# Patient Record
Sex: FEMALE | Race: WHITE | Employment: OTHER | URBAN - METROPOLITAN AREA
[De-identification: names, ages, dates, MRNs, and addresses within clinical notes are randomized per-mention and may not be internally consistent; named-entity substitution may affect disease eponyms.]

---

## 2020-08-12 LAB
CREATININE, EXTERNAL: 0.77
HBA1C MFR BLD HPLC: 5.9 %
LDL-C, EXTERNAL: 68

## 2020-08-27 NOTE — PROGRESS NOTES
[facility]  Preop Visit    Subjective:     Patria Akins is a 69 y.o. female who presents today for preoperative visit in preparation for upcoming right total knee replacement to be performed by Dr. Iona Crabtree, on an outpatient  basis. Xrays and additional studies, as appropriate, have been reviewed. Pt currently without new complaint. Past Medical History:   Diagnosis Date    Abnormal EKG     Back pain     High cholesterol     Hypertension     Osteopenia     Sciatica     Urinary frequency     UTI (urinary tract infection)        Past Surgical History:   Procedure Laterality Date    HX HYSTERECTOMY      HX ORTHOPAEDIC      Spine Surgery        Current Outpatient Medications   Medication Sig Dispense Refill    oxybutynin chloride XL (DITROPAN XL) 5 mg CR tablet Take 5 mg by mouth daily.  oxybutynin chloride XL (DITROPAN XL) 10 mg CR tablet Take 1 Tab by mouth daily. 90 Tab 6    verapamil (CALAN) 120 mg tablet Take 120 mg by mouth daily.  diazepam (VALIUM) 5 mg tablet 5 mg every eight (8) hours as needed.  celecoxib (CELEBREX) 200 mg capsule 200 mg daily.  senna-docusate (PERICOLACE) 8.6-50 mg per tablet Take 2 Tabs by Mouth Every Morning. Allergies   Allergen Reactions    Acetaminophen Rash     Tylenol #3       ROS:    Patient is a pleasant appearing individual, appropriately dressed, well hydrated, well nourished, who is alert, appropriately oriented for age, and in no acute distress with a limp gait and normal affect who does not appear to be in any significant pain. Remainder of ROS as per HPI. Objective:     Physical Exam:  VSS AFEB    Left knee - Neurovascularly intact with good cap refill, full range of motion and full strength, well healed incision noted, no swelling, no erythema, no instability.      Right knee - Decrease range of motion with flexion, Some crepitation, Grossly neurovascularly intact, Good cap refill, No skin lesion, Moderate swelling, No gross instability, Some quadriceps weakness     Studies to date:    Xrays: And all diagnostic studies have been reviewed    Labs: Reviewed    General Medicine evaluation: Although verbally we have obtained clearance, official written clearance is pending at this time    Post operative Antibiotics: Keflex  Post operative Pain Medications: Oxycodone, patient allergic to Tylenol  Post operative Blood thinning meds: Aspirin    Post operative medications, DME and physical therapy prescriptions have been provided. (Meds sent to pharmacy)    Assessment:   Primary osteoarthritis of right knee [M17.11]     Fabi Nash is a 69 y.o. female who is planning to undergo a right total knee replacement to be performed by Dr. Roxi Carnes in the near future. We will plan to proceed on an outpatient  basis. At this time, they are an appropriate candidate for surgery. The risks, benefits, complications and alternatives have been outlined with the patient at length and they voice an understanding. Based on the fact that we agree that the potential benefits outweigh the potential lists, we will plan to proceed as discussed. Ms. Sarah Alexander has a reminder for a \"due or due soon\" health maintenance. I have asked that she contact her primary care provider for follow-up on this health maintenance. Plan:   -Proceed with right total knee arthroplasty to be performed by DR. Rogers at [facility] on an outpatient basis. -Preoperative instructions have been reviewed with and provided to the patient, at length  -Patient voices understanding that any skin abnormality to the skin at the site of the involved area of planned surgery may result in cancellation and/or postponement of surgery.  -Physical therapy  to start shortly after surgery.  -Follow up 1 week postoperatively with me for close follow up.     Shani Conroy, MS, PA-C

## 2020-08-28 ENCOUNTER — OFFICE VISIT (OUTPATIENT)
Dept: ORTHOPEDIC SURGERY | Age: 70
End: 2020-08-28

## 2020-08-28 VITALS — HEIGHT: 60 IN | WEIGHT: 199 LBS | BODY MASS INDEX: 39.07 KG/M2

## 2020-08-28 DIAGNOSIS — M17.11 PRIMARY OSTEOARTHRITIS OF RIGHT KNEE: Primary | ICD-10-CM

## 2020-08-28 DIAGNOSIS — M25.561 CHRONIC PAIN OF RIGHT KNEE: ICD-10-CM

## 2020-08-28 DIAGNOSIS — E66.01 SEVERE OBESITY (HCC): ICD-10-CM

## 2020-08-28 DIAGNOSIS — G89.29 CHRONIC PAIN OF RIGHT KNEE: ICD-10-CM

## 2020-08-28 RX ORDER — CEPHALEXIN 500 MG/1
500 CAPSULE ORAL 4 TIMES DAILY
Qty: 4 CAP | Refills: 0 | Status: SHIPPED | OUTPATIENT
Start: 2020-08-28 | End: 2020-08-29

## 2020-08-28 RX ORDER — OXYCODONE HYDROCHLORIDE 5 MG/1
5 TABLET ORAL
Qty: 30 TAB | Refills: 0 | Status: SHIPPED | OUTPATIENT
Start: 2020-08-28 | End: 2020-09-04

## 2020-09-02 DIAGNOSIS — M25.561 RIGHT KNEE PAIN, UNSPECIFIED CHRONICITY: Primary | ICD-10-CM

## 2020-09-03 DIAGNOSIS — M17.11 PRIMARY OSTEOARTHRITIS OF RIGHT KNEE: Primary | ICD-10-CM

## 2020-09-03 DIAGNOSIS — M25.561 CHRONIC PAIN OF RIGHT KNEE: ICD-10-CM

## 2020-09-03 DIAGNOSIS — G89.29 CHRONIC PAIN OF RIGHT KNEE: ICD-10-CM

## 2020-09-03 RX ORDER — CEPHALEXIN 500 MG/1
500 CAPSULE ORAL 4 TIMES DAILY
Qty: 4 CAP | Refills: 0 | Status: SHIPPED | OUTPATIENT
Start: 2020-09-03 | End: 2020-09-04

## 2020-09-10 ENCOUNTER — OFFICE VISIT (OUTPATIENT)
Dept: ORTHOPEDIC SURGERY | Age: 70
End: 2020-09-10
Payer: MEDICARE

## 2020-09-10 DIAGNOSIS — M17.11 PRIMARY OSTEOARTHRITIS OF RIGHT KNEE: Primary | ICD-10-CM

## 2020-09-10 DIAGNOSIS — M25.561 CHRONIC PAIN OF RIGHT KNEE: ICD-10-CM

## 2020-09-10 DIAGNOSIS — E66.01 SEVERE OBESITY (HCC): ICD-10-CM

## 2020-09-10 DIAGNOSIS — G89.29 CHRONIC PAIN OF RIGHT KNEE: ICD-10-CM

## 2020-09-10 PROCEDURE — 99024 POSTOP FOLLOW-UP VISIT: CPT | Performed by: PHYSICIAN ASSISTANT

## 2020-09-10 RX ORDER — OXYCODONE HYDROCHLORIDE 5 MG/1
5 TABLET ORAL
Qty: 30 TAB | Refills: 0 | Status: SHIPPED | OUTPATIENT
Start: 2020-09-10 | End: 2020-09-17

## 2020-09-10 NOTE — PATIENT INSTRUCTIONS
--During the patient's visit today, the patient was instructed to no longer wear the compression stocking on the unaffected leg. Pt  instructed that the compression stocking on the affected leg should be continued for a total of 3 weeks postoperatively. The patient was also reminded to continue their blood thinning medication (Aspirin) for a total of 3 weeks postoperatively as instructed at the time of discharge. Their dressing was taken down and will not need to be replaced. --The patient was instructed that they may now shower and the incision may get wet. The patient was asked not to [facility]" their wound. The patient was reminded that although they may shower they should avoid baths, hot tubs, oceans, pools, lakes, and springs. Patient was instructed to make sure that when the wound does get wet to dry it very well. Patient was also reminded to not yet use any creams, lotions, ointments, salves, antibiotic creams, or AAA. Patient was told to leave the surgical tape that is covering the incision intact for another week. At that point it can be removed. It may be slightly easier if a very small thin layer of Vaseline is utilized to break up the glue holding it in place. Once the glue comes off it is imperative they remove the Vaseline. Patient may drive once they are in no pain and on no pain medication. --Patient will have a follow-up appointment in the next several weeks to assess progression in physical therapy and to receive further instruction. Patient was cautioned to be on the look out for increased swelling of the surgical knee, spreading of redness or warmth that persists around the incision site. Patient was reminded that some swelling or redness following physical therapy is not abnormal.  They were also instructed to be on the look out for drainage and/or pus coming from the incision site.   Again the patient was warned about a fever of 101.5 that does not respond to Tylenol and an inability to bear weight on affected leg. Pt was also cautioned about an acute increase in pain that persists. If any of these issues should arise the patient will not hesitate to contact our office for further evaluation and care. Additional pain management was reviewed with the patient and if additional prescription pain medication is required it has been sent to the patient's pharmacy. Questions were solicited and answered to the patient's satisfaction and the patient will follow-up as discussed.

## 2020-09-10 NOTE — PROGRESS NOTES
[facility]  Total Knee Replacement Follow up    Subjective:    Bronson Soto is a 69 y.o. female presents for postop care status post right total knee arthroplasty performed on 3 September 2020 by Dr. Denis Hernandez. Pain is generally well controlled. Appetite is returning to normal. Patient has begun physical therapy. Dressing and stockings are in place. Pt has been taking blood thinning medication as recommended. Ambulating without difficulty. No problems with wound. Otherwise without complaint. ROS  Patient is a pleasant appearing individual, appropriately dressed, well hydrated, well nourished, who is alert, appropriately oriented for age, and in no acute distress with a limp gait and normal affect who does not appear to be in any significant pain. Objective:     VSS AFEB  Left knee - Neurovascularly intact with good cap refill, full range of motion and full strength,  no swelling, no erythema, no instability. Right knee - Decrease range of motion with flexion, Some crepitation, Grossly neurovascularly intact, Good cap refill, well healed incision noted, No skin lesion, Moderate swelling, No gross instability, Some quadriceps weakness     Assessment:     No primary diagnosis found. No orders of the defined types were placed in this encounter. Doing well post operatively. Ms. Bobby Angelo has a reminder for a \"due or due soon\" health maintenance. I have asked that she contact her primary care provider for follow-up on this health maintenance. Plan:     --During the patient's visit today, the patient was instructed to no longer wear the compression stocking on the unaffected leg. They were instructed that the compression stocking on the affected leg should be continued for a total of 3 weeks postoperatively. They were also reminded to continue their blood thinning medication for a total of 3 weeks postoperatively as instructed at the time of discharge.   Their dressing was taken down and will not need to be replaced. --The patient was instructed that they may now shower and the incision may get wet. The patient was asked not to [facility]" their wound. They are reminded that although they may shower they should avoid baths, hot tubs, lotions, pools, lakes, and springs. Patient was instructed to make sure that when the wound does get wet to dry it very well. They were also reminded to not yet use any creams, lotions, ointments, salves, antibiotic creams, or AAA. Patient was told to leave the surgical tape that is covering the incision intact for another week. At that point it can be removed. It may be slightly easier if a very small thin layer of Vaseline is utilized to break up the glue holding it in place. Once the glue comes off it is imperative they remove the Vaseline. Patient may drive once they are in no pain and on no pain medication. --Patient will have a follow-up appointment in the next several weeks to assess progression in physical therapy and to receive further instruction. Patient was cautioned to be on the look out for increased swelling of the surgical knee, spreading of redness or warmth that persists around the incision site. They were reminded that some swelling or redness following physical therapy is not abnormal.  They were also instructed to be on the look out for drainage and/or pus coming from the incision site. Again they were warned about a fever of 101.5 that does not respond to Tylenol and and inability to bear weight on affected leg. They were also cautioned about an acute increase in pain that persists. If any of these issues should arise they will not hesitate to contact our office for further evaluation and care. Additional pain management was reviewed with the patient and if additional prescription pain medication is required it has been sent to the patient's pharmacy.    -Pt to follow up as instructed. Questions were solicited and answered to the patient's satisfaction and the patient will follow-up as discussed.       Signed By: Juan Luis Villalobos MS, PA-C    September 10, 2020

## 2020-10-08 ENCOUNTER — HOSPITAL ENCOUNTER (OUTPATIENT)
Dept: PHYSICAL THERAPY | Age: 70
Discharge: HOME | End: 2020-10-08
Payer: MEDICARE

## 2020-10-08 PROCEDURE — 97110 THERAPEUTIC EXERCISES: CPT

## 2020-10-08 PROCEDURE — 97140 MANUAL THERAPY 1/> REGIONS: CPT

## 2020-10-08 NOTE — PROGRESS NOTES
[facility] [address], [address] [facility], [address] [place] [address]  Phone: [phone]    Fax: [phone]   Progress Note/CONTINUED PLAN OF CARE for PHYSICAL THERAPY          Patient Name: Donna Hackett : 1950   Treatment/Medical Diagnosis: Pain in right knee [M25.561]   Onset Date: 9/10/20    Referral Source: Abelion Gama Start of Care [facility]): 20   Prior Hospitalization: See Medical History Provider #: [id number]   Prior Level of Function: Ambulatory, ind   Comorbidities: HTN   Medications: Verified on Patient Summary List   Visits from [facility]: 1 Missed Visits: 0   Subjective:  Patient is returning to therapy now 4 weeks post-op, having not been able to get authorization for physical therapy until this week. Patient states she has been doing well since surgery, reporting that she can tell she is getting better. 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 Patient reports having one fall when she was climbing into her bathtub at home, but denies significant injury, stating she may have pulled something in her groin on R.     Objective:                  AROM   PROM   MMT    Left Right Left Right Left Right   Hip Flexion     4+/5 4+/5    Extension     5/5 5/5    Abduction          IR/ER         Knee Flexion  117  120 5/5 5/5    Extension  -11  -8 5/5 5/5`   Ankle Plantarflexion     5/5 5/5    Dorsiflexion  Inversion  Eversion      5/5 5/5         Short term goals (to be completed in 3 weeks) Goal Status   1. Patient will report the knowledge of 3 exercises that can be used to help reduce symptoms to be able to ind reduce symptoms while at home. Status at last Eval: Initiated HEP Current Status:  New   2. Patient will demonstrate a 1/2 grade improvement in R hip MMT to be able to get in and out of the tub without falling. Status at last Eval: 4+/5 Current Status:  New   3.   Patient will demonstrate R knee PROM of -5-125 deg to be able to get in and out of low seated cars. Status at last Eval: -8-120 deg Current Status:  New     Long term goals (to be completed in 6 weeks) Goal Status   1. Patient will demonstrate the ability to complete 5x sit to  10 seconds to demonstrate improved functional strength to have reduced difficulty with transfers. Status at last Eval: >10 seconds Current Status:  New   2. Patient will demonstrate the ability to ascend and descend 3x4 6\" steps with a step through pattern with 0 handrail assist to be able to more easily get in and out of patient's home. Status at last Eval: 1 Handrail assist, lacks eccentric control Current Status:  New   3. Patient will demonstrate R knee AROM of 0-120 deg or greater to be able to return to normal ambulation on level and unlevel ground. Status at last Eval: -11- 117 deg Current Status:  New       Key Functional Changes/Progress: Patient demonstrates improvements in her R knee ROM from baseline, with most notable improvement with R knee active range. Patient demonstrates good strength in R knee with flexion and extension, and demonstrates improvements with gait, now ambulating without an AD with a near symmetrical gait pattern, through lacking extension is evident on R LE.      Problem List: pain affecting function, decrease ROM, decrease strength, edema affecting function, impaired gait/ balance, decrease ADL/ functional abilitiies, decrease activity tolerance and decrease flexibility/ joint mobility     Updated Plan of Care:    Treatment Plan to include the following per provider discretion: Therapeutic exercise, Physical agent/modality, Gait/balance training, Manual therapy, Patient education, Self Care training, Functional mobility training and Home safety training    Frequency / Duration:  Patient to be seen   2-3   times per week for   6  weeks    Assessment/ Patient Update: Patient returning to therapy for first time since initial eval due to having difficulty getting insurance authorization. Patient has made progress since eval, having been compliant with HEP, and is progressing well at this time. Patient most notable deficit at this time is poor knee extension, lacking ~11 deg actively, with this affecting her gait pattern at this time. Patient HEP and goals were updated today. Patient will continue to benefit from skilled PT at this time. Discharge planning: Continue with therapy until all goals have been ment. If you have any questions/comments please contact us directly at [phone]. Thank you for allowing us to assist in the care of your patient. Therapist Signature: Yoni Sandoval DPT Date: 44/0/7470   Certification Period:  Reporting Period: 10/8/20- 11/21/20 9/8/20- 10/8/20 Time: 7:44 AM   NOTE TO PHYSICIAN:  PLEASE COMPLETE THE ORDERS BELOW AND FAX TO   [facility]: [phone]. If you are unable to process this request in 24 hours please contact our office: [phone].    ___ I have read the above report and request that my patient continue as recommended.   ___ I have read the above report and request that my patient continue therapy with the following changes/special instructions: ________________________________________________   ___ I have read the above report and request that my patient be discharged from therapy.      Physician Signature:        Date:       Time:

## 2020-10-08 NOTE — PROGRESS NOTES
PT DAILY TREATMENT NOTE     Patient Name: Maximo Potts  Date:10/8/2020  : 1950  [x]  Patient  Verified  Payor: BLUE CROSS MEDICARE / Plan: [facility] HMO / Product Type: Managed Care Medicare /    In time: 2945  Out time: 1004  Total Treatment Time (min): 68  Billed time (min): 58  1:1 Treatment Time (min): 58   Visit #: 1 of 8    Diagnosis/ Reason for Treatment: Pain in right knee [M25.561]    SUBJECTIVE  Pain Level In(0-10 scale): 10    Any medication changes, allergies to medications, adverse drug reactions, diagnosis change, or new procedure performed?: [x] No    [] Yes (see summary sheet for update)    Subjective:  Patient is returning to therapy now 4 weeks post-op, having not been able to get authorization for physical therapy until this week. Patient states she has been doing well since surgery, reporting that she can tell she is getting better. 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 Patient reports having one fall when she was climbing into her bathtub at home, but denies significant injury, stating she may have pulled something in her groin on R. Medicare/BCBS Only   Total Timed Codes (min):  58 1:1 Treatment Time:  58     OBJECTIVE  Modality rationale: decrease edema, decrease inflammation and decrease pain to improve the patients ability to tolerate therapy and have reduce pain post therapy.    Min Type Additional Details    [] Estim: []Att   []Unatt  []TENS instruct                 []IFC  []Premod []NMES                       []Other:  []w/US   []w/ice   []w/heat  Position:  Location:    []  Traction: [] Cervical       []Lumbar                       [] Prone          []Supine                       []Intermittent   []Continuous Lbs:  [] before manual  [] after manual    []  Ultrasound: []Continuous   [] Pulsed                           []1MHz   []3MHz Location:  W/cm2:    [] Iontophoresis with dexamethasone         Location: [] Take home patch   [] In clinic    []  Ice     []  heat  []  Ice massage Position:  Location:   10 []  Vasopneumatic Device Pressure: [] lo [x] med [] hi   Temp: [x] lo [] med [] hi   [x] Skin assessment post-treatment:  [x]intact []redness- no adverse reaction       []redness  adverse reaction:       44 min Therapeutic Exercise:  [] See flow sheet :   Rationale: increase ROM, increase strength, improve balance and increase proprioception to improve the patients ability to complete daily activities without exacerbating pain. min Therapeutic Activity:  []  See flow sheet :   Rationale:       min Neuromuscular Re-education:  []  See flow sheet :   Rationale:     14 Min Manual Therapy:  STM to posterior hamstring tendons at knee, scar tissue massage at incision cite. Rationale: decrease pain, increase ROM and increase tissue extensibility to help improve R knee extension ROM, help with remodeling of incision, and help to control patient pain. min Gait Training:  ___ feet with ___ device on level surfaces with ___ level of assist   Rationale: With   [] TE   [] TA   [] neuro   [] other: Patient Education: [x] Review HEP    [] Progressed/Changed HEP based on:   [] positioning   [] body mechanics   [] transfers   [] heat/ice application    [] other:           Pain Level Out(0-10 scale): 0/10    Patient response to today's treatment: Patient reports notable pain with knee extension prop stretch, worst in posterior knee. Patient reports a reduction in pain with vaso end of session. Functional Assessment: As patient has not been since initial eval, now 4 weeks post op, a progress note was completed this visit to assess patient progress with ind HEP. Patient's most notable deficit is with lacking terminal knee extension, demonstrating ~10 deg knee contracture.  Based on this, emphasized knee extension ROM with patient today, working on posterior chain extensibility, AAROM exercises, and a prop stretch to utilize the creep principle, working on attaining more range. Massage therapy used to address soft tissue adhesions affecting extension ROM. Patient will continue to benefit from skilled PT services, to modify and progress therapeutic interventions, address functional mobility deficits, address ROM deficits, address strength deficits, analyze and address soft tissue restrictions and analyze and cue movement patterns, in order to continue to make progress toward remaining PT goals. []  See Plan of Care  [x]  See progress note/recertification  []  See Discharge Summary         Progress towards goals / Updated goals:  Initiated new goals based on patient updated progress, see progress note for goals.       PLAN  [x]  Upgrade activities as tolerated     [x]  Continue plan of care  []  Update interventions per flow sheet       []  Discharge due to:_  []  Other:_      Mariajose Anne DPT 10/8/2020  7:43 AM

## 2020-10-12 ENCOUNTER — HOSPITAL ENCOUNTER (OUTPATIENT)
Dept: PHYSICAL THERAPY | Age: 70
Discharge: HOME | End: 2020-10-12
Payer: MEDICARE

## 2020-10-12 PROCEDURE — 97016 VASOPNEUMATIC DEVICE THERAPY: CPT

## 2020-10-12 PROCEDURE — 97110 THERAPEUTIC EXERCISES: CPT

## 2020-10-12 NOTE — PROGRESS NOTES
PT DAILY TREATMENT NOTE     Patient Name: Rhonda Rojas  Date:10/12/2020  : 1950  [x]  Patient  Verified  Payor: BLUE CROSS MEDICARE / Plan: [facility]  HMO / Product Type: Managed Care Medicare /    In time:   Out time: 938  Total Treatment Time (min): 54  Billed time (min): 34  1:1 Treatment Time (min): 34   Visit #: 2    Diagnosis/ Reason for Treatment: Pain in right knee [M25.561]    SUBJECTIVE  Pain Level In(0-10 scale): 2/10    Any medication changes, allergies to medications, adverse drug reactions, diagnosis change, or new procedure performed?: [x] No    [] Yes (see summary sheet for update)    Subjective:  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 Patient denies significant pain. Medicare/BCBS Only   Total Timed Codes (min):  34 1:1 Treatment Time:  34     OBJECTIVE  Modality rationale: decrease edema, decrease inflammation, decrease pain and increase tissue extensibility to improve the patients ability to tolerate PT session without exacerbating symptoms.     Min Type Additional Details    [] Estim: []Att   []Unatt  []TENS instruct                 []IFC  []Premod []NMES                       []Other:  []w/US   []w/ice   []w/heat  Position:  Location:    []  Traction: [] Cervical       []Lumbar                       [] Prone          []Supine                       []Intermittent   []Continuous Lbs:  [] before manual  [] after manual    []  Ultrasound: []Continuous   [] Pulsed                           []1MHz   []3MHz Location:  W/cm2:    []  Iontophoresis with dexamethasone         Location: [] Take home patch   [] In clinic   10 []  Ice     [x]  heat  []  Ice massage Position: Seated  Location: R knee   10 []  Vasopneumatic Device Pressure: [] lo [x] med [] hi   Temp: [x] lo [] med [] hi   [x] Skin assessment post-treatment:  [x]intact []redness- no adverse reaction       []redness  adverse reaction:       34 min Therapeutic Exercise:  [] See flow sheet :   Rationale: increase ROM, increase strength and improve balance to improve the patients ability to complete daily tasks working toward her PLOF. With   [x] TE   [] TA   [] neuro   [] other: Patient Education: [x] Review HEP    [] Progressed/Changed HEP based on:   [] positioning   [] body mechanics   [] transfers   [] heat/ice application    [] other:           Pain Level Out(0-10 scale): 0/10    Patient response to today's treatment: Patient reports pretty notable pain during prop stretch and reports calf pain as chief complaint, symptoms relieved with vasopneumatic compression end of session. Functional Assessment: Continued with therapy, working on regaining full active ROM, strengthening, and restoring muscle function to PLOF. Emphasized R knee extension as patient has most notable deficits with this. Worked on [address] and static prolonged stretching, with manual therapy utilized to address posterior chain tightness, with STM of the hamstrings and gastroc at the posterior knee joint. Patient will continue to benefit from skilled PT services, to modify and progress therapeutic interventions, address functional mobility deficits, address ROM deficits, address strength deficits, analyze and address soft tissue restrictions and analyze and cue movement patterns, in order to continue to make progress toward remaining PT goals.      []  See Plan of Care  []  See progress note/recertification  []  See Discharge Summary         Progress towards goals / Updated goals:  Patient is making steady progress toward goals, with AAROM measured -5-120 deg today    PLAN  [x]  Upgrade activities as tolerated     [x]  Continue plan of care  []  Update interventions per flow sheet       []  Discharge due to:_  []  Other:_      Alexander Nicole DPT 10/12/2020  7:33 AM

## 2020-10-15 ENCOUNTER — OFFICE VISIT (OUTPATIENT)
Dept: ORTHOPEDIC SURGERY | Age: 70
End: 2020-10-15
Payer: MEDICARE

## 2020-10-15 DIAGNOSIS — M17.11 PRIMARY OSTEOARTHRITIS OF RIGHT KNEE: Primary | ICD-10-CM

## 2020-10-15 PROCEDURE — 99024 POSTOP FOLLOW-UP VISIT: CPT | Performed by: ORTHOPAEDIC SURGERY

## 2020-10-15 RX ORDER — CEFUROXIME AXETIL 500 MG/1
TABLET ORAL
COMMUNITY
Start: 2020-08-24

## 2020-10-15 NOTE — PATIENT INSTRUCTIONS
Knee Pain or Injury: Care Instructions  Your Care Instructions     Injuries are a common cause of knee problems. Sudden (acute) injuries may be caused by a direct blow to the knee. They can also be caused by abnormal twisting, bending, or falling on the knee. Pain, bruising, or swelling may be severe, and may start within minutes of the injury. Overuse is another cause of knee pain. Other causes are climbing stairs, kneeling, and other activities that use the knee. Everyday wear and tear, especially as you get older, also can cause knee pain. Rest, along with home treatment, often relieves pain and allows your knee to heal. If you have a serious knee injury, you may need tests and treatment. Follow-up care is a key part of your treatment and safety. Be sure to make and go to all appointments, and call your doctor if you are having problems. It's also a good idea to know your test results and keep a list of the medicines you take. How can you care for yourself at home? · Be safe with medicines. Read and follow all instructions on the label. ? If the doctor gave you a prescription medicine for pain, take it as prescribed. ? If you are not taking a prescription pain medicine, ask your doctor if you can take an over-the-counter medicine. · Rest and protect your knee. Take a break from any activity that may cause pain. · Put ice or a cold pack on your knee for 10 to 20 minutes at a time. Put a thin cloth between the ice and your skin. · Prop up a sore knee on a pillow when you ice it or anytime you sit or lie down for the next 3 days. Try to keep it above the level of your heart. This will help reduce swelling. · If your knee is not swollen, you can put moist heat, a heating pad, or a warm cloth on your knee. · If your doctor recommends an elastic bandage, sleeve, or other type of support for your knee, wear it as directed.   · Follow your doctor's instructions about how much weight you can put on your leg. Use a cane, crutches, or a walker as instructed. · Follow your doctor's instructions about activity during your healing process. If you can do mild exercise, slowly increase your activity. · Reach and stay at a healthy weight. Extra weight can strain the joints, especially the knees and hips, and make the pain worse. Losing even a few pounds may help. When should you call for help? Call 911 anytime you think you may need emergency care. For example, call if:    · You have symptoms of a blood clot in your lung (called a pulmonary embolism). These may include:  ? Sudden chest pain. ? Trouble breathing. ? Coughing up blood. Call your doctor now or seek immediate medical care if:    · You have severe or increasing pain.     · Your leg or foot turns cold or changes color.     · You cannot stand or put weight on your knee.     · Your knee looks twisted or bent out of shape.     · You cannot move your knee.     · You have signs of infection, such as:  ? Increased pain, swelling, warmth, or redness. ? Red streaks leading from the knee. ? Pus draining from a place on your knee. ? A fever.     · You have signs of a blood clot in your leg (called a deep vein thrombosis), such as:  ? Pain in your calf, back of the knee, thigh, or groin. ? Redness and swelling in your leg or groin. Watch closely for changes in your health, and be sure to contact your doctor if:    · You have tingling, weakness, or numbness in your knee.     · You have any new symptoms, such as swelling.     · You have bruises from a knee injury that last longer than 2 weeks.     · You do not get better as expected. Where can you learn more? Go to http://www.gray.com/  Enter K195 in the search box to learn more about \"Knee Pain or Injury: Care Instructions. \"  Current as of: June 26, 2019               Content Version: 12.6  © 8625-2823 [employer].    Care instructions adapted under license by [employer] (which disclaims liability or warranty for this information). If you have questions about a medical condition or this instruction, always ask your healthcare professional. [address] any warranty or liability for your use of this information.

## 2020-10-15 NOTE — PROGRESS NOTES
Name: Maximo Potts    : 1950     Service Dept: [address] [place] [address] and Sports Medicine    Patient's Pharmacies:    [address] [place] [address]  Phone: [phone] Fax: [phone]       Chief Complaint   Patient presents with    Surgical Follow-up          There were no vitals taken for this visit. Allergies   Allergen Reactions    Acetaminophen Rash     Tylenol #3        Current Outpatient Medications   Medication Sig Dispense Refill    cefUROXime (CEFTIN) 500 mg tablet TAKE 1 TABLET BY MOUTH TWICE DAILY      oxybutynin chloride XL (DITROPAN XL) 5 mg CR tablet Take 5 mg by mouth daily.  oxybutynin chloride XL (DITROPAN XL) 10 mg CR tablet Take 1 Tab by mouth daily. 90 Tab 6    verapamil (CALAN) 120 mg tablet Take 120 mg by mouth daily.  diazepam (VALIUM) 5 mg tablet 5 mg every eight (8) hours as needed.  celecoxib (CELEBREX) 200 mg capsule 200 mg daily.  senna-docusate (PERICOLACE) 8.6-50 mg per tablet Take 2 Tabs by Mouth Every Morning.           Patient Active Problem List   Diagnosis Code    Urinary frequency R35.0    UTI (urinary tract infection) N39.0    Severe obesity ([address] E66.01        Family History   Problem Relation Age of Onset    Heart Disease Mother     Heart Attack Mother     Cancer Mother         Social History     Socioeconomic History    Marital status: UNKNOWN     Spouse name: Not on file    Number of children: Not on file    Years of education: Not on file    Highest education level: Not on file   Tobacco Use    Smoking status: Never Smoker    Smokeless tobacco: Never Used   Substance and Sexual Activity    Alcohol use: No     Alcohol/week: 0.0 standard drinks    Drug use: No        Past Surgical History:   Procedure Laterality Date    HX HYSTERECTOMY      HX ORTHOPAEDIC      Spine Surgery         Past Medical History:   Diagnosis Date    Abnormal EKG     Back pain     High cholesterol     Hypertension     Osteopenia     Sciatica     Urinary frequency     UTI (urinary tract infection)           Right knee - Neurovascularly intact with good cap refill, full range of motion and full strength, well healed incision noted, no swelling, no erythema, no instability. Left knee - Decrease range of motion with flexion, Some crepitation, Grossly neurovascularly intact, Good cap refill, No skin lesion, Moderate swelling, No gross instability, Some quadriceps weakness    I have reviewed and agree with PFSH and ROS and intake form in chart and the record. Encounter Diagnoses     ICD-10-CM ICD-9-CM   1. Primary osteoarthritis of right knee  M17.11 715.16          HPI:  The patient is status post right total knee replacement on 9/3/2020, doing well, just a little bit of soreness. Assessment/Plan:  Plan at this point, activities as tolerated started, yearly appointment. She has no complaints today. Return to Office: Follow-up and Dispositions    · Return in about 1 year (around 10/15/2021) for w/ Xrays. Scribed by Arthbahman Troy as dictated by Elaine Farmer. Daniel Boggs MD.    Documentation True and Accepted Erasmo Boggs MD

## 2020-10-16 ENCOUNTER — HOSPITAL ENCOUNTER (OUTPATIENT)
Dept: PHYSICAL THERAPY | Age: 70
Discharge: HOME | End: 2020-10-16
Payer: MEDICARE

## 2020-10-16 PROCEDURE — 97110 THERAPEUTIC EXERCISES: CPT

## 2020-10-16 PROCEDURE — 97016 VASOPNEUMATIC DEVICE THERAPY: CPT

## 2020-10-16 NOTE — PROGRESS NOTES
PT DAILY TREATMENT NOTE     Patient Name: Booker Pitts  Date:10/16/2020  : 1950  [x]  Patient  Verified  Payor: BLUE CROSS MEDICARE / Plan: [facility]  HMO / Product Type: Managed Care Medicare /    In time:0830  Out FEM  Total Treatment Time (min): 62  Total Timed Codes (min): 42   Visit #: 3    Treatment Area: Pain in right knee [M25.561]    SUBJECTIVE  Pain Level (0-10 scale): 7  Any medication changes, allergies to medications, adverse drug reactions, diagnosis change, or new procedure performed?: [x] No    [] Yes (see summary sheet for update)  Subjective functional status/changes:   [] No changes reported  Pt. Reports increased stiffness, tightness, and pain from previous visit which she attributes to the weather and a sleepless night. OBJECTIVE  Modality rationale: decrease inflammation, decrease pain and increase tissue extensibility to improve the patients ability to complete rehab related activities pain free.     Min Type Additional Details    [] Estim: []Att   []Unatt  []TENS instruct                 []IFC  []Premod []NMES                       []Other:  []w/US   []w/ice   []w/heat  Position:  Location:    []  Traction: [] Cervical       []Lumbar                       [] Prone          []Supine                       []Intermittent   []Continuous Lbs:  [] before manual  [] after manual    []  Ultrasound: []Continuous   [] Pulsed                           []1MHz   []3MHz Location:  W/cm2:    []  Iontophoresis with dexamethasone         Location: [] Take home patch   [] In clinic   10 []  Ice     [x]  heat  []  Ice massage Position: Long sitting   Location: R knee   10 [x]  Vasopneumatic Device Pressure: [x] lo [x] med [] hi   Temp: [] lo [x] med [] hi   [] Skin assessment post-treatment:  []intact []redness- no adverse reaction       []redness  adverse reaction:       40 min Therapeutic Exercise:  [x] See flow sheet :   Rationale: increase ROM, increase strength and improve balance to improve the patients ability to ambulate long distances and independently complete stair navigation. min Patient Education: [x] Review HEP    [] Progressed/Changed HEP based on:   [] positioning   [] body mechanics   [] transfers   [] heat/ice application        Pain Level (0-10 scale) post treatment: 0    ASSESSMENT/Changes in Function: Pt. Entered with increased stiffness. Session began with MHP and stepper to for tissue elongation and range of motion promotion. Continued with exercises to improve strength and knee range of motion. Pt. Limited by pain being cued for multiple rest breaks. Patient will continue to benefit from skilled PT services to modify and progress therapeutic interventions, address functional mobility deficits, address ROM deficits, address strength deficits and analyze and address soft tissue restrictions to attain remaining goals.      [x]  See Plan of Care  []  See progress note/recertification  []  See Discharge Summary          PLAN  [x]  Upgrade activities as tolerated     []  Continue plan of care  []  Update interventions per flow sheet       []  Discharge due to:_  []  Other:_      MICHELE Medrano  10/16/2020  10:01 AM

## 2020-10-19 ENCOUNTER — HOSPITAL ENCOUNTER (OUTPATIENT)
Dept: PHYSICAL THERAPY | Age: 70
Discharge: HOME | End: 2020-10-19
Payer: MEDICARE

## 2020-10-19 PROCEDURE — 97110 THERAPEUTIC EXERCISES: CPT

## 2020-10-19 PROCEDURE — 97016 VASOPNEUMATIC DEVICE THERAPY: CPT

## 2020-10-19 NOTE — PROGRESS NOTES
PT DAILY TREATMENT NOTE 8-    Patient Name: Zhang Wilson  Date:10/19/2020  : 1950  [x]  Patient  Verified  Payor: BLUE CROSS MEDICARE / Plan: [facility]  HMO / Product Type: Managed Care Medicare /    In time:948  Out time:1058  Total Treatment Time (min): 70  Total Timed Codes (min): 60    Visit #: 4  of  8    Treatment Area: Pain in right knee [M25.561]    SUBJECTIVE  Pain Level (0-10 scale): 5/10  Any medication changes, allergies to medications, adverse drug reactions, diagnosis change, or new procedure performed?: [x] No    [] Yes (see summary sheet for update)  Subjective functional status/changes:   [] No changes reported  Pt reported arrival pain was ~5/10 today as she had an ache. No AD device used with ambulation today. OBJECTIVE  Modality rationale: decrease pain and increase tissue extensibility to improve the patients ability to return to normal function. Min Type Additional Details    [] Estim: []Att   []Unatt  []TENS instruct                 []IFC  []Premod []NMES                       []Other:  []w/US   []w/ice   []w/heat  Position:  Location:    []  Traction: [] Cervical       []Lumbar                       [] Prone          []Supine                       []Intermittent   []Continuous Lbs:  [] before manual  [] after manual    []  Ultrasound: []Continuous   [] Pulsed                           []1MHz   []3MHz Location:  W/cm2:    []  Iontophoresis with dexamethasone         Location: [] Take home patch   [] In clinic   10 []  Ice     [x]  heat  []  Ice massage Position:seated  Location: R knee   10 [x]  Vasopneumatic Device Pressure: [x] lo [] med [] hi   Temp: [x] lo [] med [] hi       50 min Therapeutic Exercise:  [x] See flow sheet :   Rationale: increase ROM, increase strength and improve balance to improve the patients ability to ambulate pain free.      Session began with MH to R knee in extension, followed by warm up on steppe,stretches, strengthening, and ROM as noted per flow sheet. min Patient Education: [x] Review HEP    [] Progressed/Changed HEP based on:   [] positioning   [] body mechanics   [] transfers   [] heat/ice application           Pain Level (0-10 scale) post treatment: 3/10    ASSESSMENT/Changes in Function: Pt making progress with ROM and strength, however still has c/o pain. Patient will continue to benefit from skilled PT services to address functional mobility deficits, address ROM deficits and address strength deficits to attain remaining goals.      [x]  See Plan of Care  []  See progress note/recertification  []  See Discharge Summary            PLAN  []  Upgrade activities as tolerated     [x]  Continue plan of care  []  Update interventions per flow sheet       []  Discharge due to:_  []  Other:_      MICHELE Zapata  10/19/2020  12:39 PM

## 2020-10-22 ENCOUNTER — TELEPHONE (OUTPATIENT)
Dept: ORTHOPEDIC SURGERY | Age: 70
End: 2020-10-22

## 2020-10-22 DIAGNOSIS — M17.11 PRIMARY OSTEOARTHRITIS OF RIGHT KNEE: Primary | ICD-10-CM

## 2020-10-22 RX ORDER — TRAMADOL HYDROCHLORIDE 50 MG/1
50 TABLET ORAL
Qty: 30 TAB | Refills: 0 | Status: SHIPPED | OUTPATIENT
Start: 2020-10-22 | End: 2020-11-01

## 2021-03-09 ENCOUNTER — OFFICE VISIT (OUTPATIENT)
Dept: ORTHOPEDIC SURGERY | Age: 71
End: 2021-03-09
Payer: MEDICARE

## 2021-03-09 VITALS — WEIGHT: 185 LBS | BODY MASS INDEX: 34.93 KG/M2 | HEIGHT: 61 IN

## 2021-03-09 DIAGNOSIS — G89.29 CHRONIC PAIN OF LEFT KNEE: ICD-10-CM

## 2021-03-09 DIAGNOSIS — M25.562 CHRONIC PAIN OF LEFT KNEE: ICD-10-CM

## 2021-03-09 DIAGNOSIS — M17.12 PRIMARY OSTEOARTHRITIS OF LEFT KNEE: Primary | ICD-10-CM

## 2021-03-09 DIAGNOSIS — M17.12 PRIMARY OSTEOARTHRITIS OF LEFT KNEE: ICD-10-CM

## 2021-03-09 PROCEDURE — G8510 SCR DEP NEG, NO PLAN REQD: HCPCS | Performed by: ORTHOPAEDIC SURGERY

## 2021-03-09 PROCEDURE — G8536 NO DOC ELDER MAL SCRN: HCPCS | Performed by: ORTHOPAEDIC SURGERY

## 2021-03-09 PROCEDURE — G8417 CALC BMI ABV UP PARAM F/U: HCPCS | Performed by: ORTHOPAEDIC SURGERY

## 2021-03-09 PROCEDURE — 1090F PRES/ABSN URINE INCON ASSESS: CPT | Performed by: ORTHOPAEDIC SURGERY

## 2021-03-09 PROCEDURE — 1101F PT FALLS ASSESS-DOCD LE1/YR: CPT | Performed by: ORTHOPAEDIC SURGERY

## 2021-03-09 PROCEDURE — 99214 OFFICE O/P EST MOD 30 MIN: CPT | Performed by: ORTHOPAEDIC SURGERY

## 2021-03-09 PROCEDURE — 3017F COLORECTAL CA SCREEN DOC REV: CPT | Performed by: ORTHOPAEDIC SURGERY

## 2021-03-09 PROCEDURE — G8427 DOCREV CUR MEDS BY ELIG CLIN: HCPCS | Performed by: ORTHOPAEDIC SURGERY

## 2021-03-09 PROCEDURE — G8399 PT W/DXA RESULTS DOCUMENT: HCPCS | Performed by: ORTHOPAEDIC SURGERY

## 2021-03-09 NOTE — PROGRESS NOTES
Name: Zaheer Montez    : 1950     Service Dept: [address] [place] [address] and Sports Medicine    Patient's Pharmacies:    [address] [place] [address]  Phone: [phone] Fax: [phone]       Chief Complaint   Patient presents with    Knee Pain        Visit Vitals  Ht 5' 1\" (1.549 m)   Wt 185 lb (83.9 kg)   BMI 34.96 kg/m²      Allergies   Allergen Reactions    Acetaminophen Rash     Tylenol #3      Current Outpatient Medications   Medication Sig Dispense Refill    cefUROXime (CEFTIN) 500 mg tablet TAKE 1 TABLET BY MOUTH TWICE DAILY      oxybutynin chloride XL (DITROPAN XL) 5 mg CR tablet Take 5 mg by mouth daily.  oxybutynin chloride XL (DITROPAN XL) 10 mg CR tablet Take 1 Tab by mouth daily. 90 Tab 6    verapamil (CALAN) 120 mg tablet Take 120 mg by mouth daily.  diazepam (VALIUM) 5 mg tablet 5 mg every eight (8) hours as needed.  celecoxib (CELEBREX) 200 mg capsule 200 mg daily.  senna-docusate (PERICOLACE) 8.6-50 mg per tablet Take 2 Tabs by Mouth Every Morning.         Patient Active Problem List   Diagnosis Code    Urinary frequency R35.0    UTI (urinary tract infection) N39.0    Severe obesity (HCC) E66.01      Family History   Problem Relation Age of Onset    Heart Disease Mother     Heart Attack Mother     Cancer Mother       Social History     Socioeconomic History    Marital status: UNKNOWN     Spouse name: Not on file    Number of children: Not on file    Years of education: Not on file    Highest education level: Not on file   Tobacco Use    Smoking status: Never Smoker    Smokeless tobacco: Never Used   Substance and Sexual Activity    Alcohol use: No     Alcohol/week: 0.0 standard drinks    Drug use: No      Past Surgical History:   Procedure Laterality Date    HX HYSTERECTOMY      HX ORTHOPAEDIC      Spine Surgery       Past Medical History:   Diagnosis Date    Abnormal EKG  Back pain     High cholesterol     Hypertension     Osteopenia     Sciatica     Urinary frequency     UTI (urinary tract infection)         I have reviewed and agree with PFSH and ROS and intake form in chart and the record furthermore I have reviewed prior medical record(s) regarding this patients care during this appointment. Review of Systems:   Patient is a pleasant appearing individual, appropriately dressed, well hydrated, well nourished, who is alert, appropriately oriented for age, and in no acute distress with a normal gait and normal affect who does not appear to be in any significant pain. Physical Exam:  Left Knee -Decrease range of motion with flexion, Knee arc of greater than 50 degrees, Some crepitation, Grossly neurovascularly intact, Good cap refill, No skin lesion, Moderate swelling, No gross instability, Some quadriceps weakness, Kellgren and Arias at least grade 3    Right Knee - Full Range of Motion, No crepitation, Grossly neurovascularly intact, Good cap refill, No skin lesion, No swelling, No gross instability, No quadriceps weakness     Encounter Diagnoses     ICD-10-CM ICD-9-CM   1. Primary osteoarthritis of left knee  M17.12 715.16   2. Chronic pain of left knee  M25.562 719.46    G89.29 338.29       HPI:  The patient is here with a chief complaint of left knee pain, stabbing, burning pain. She is status post right total knee replacement last September and would like the left one done. Pain is 8/10. ROS:  10-point review of systems is positive for nighttime pain. X-rays of the left knee are positive for severe OA. Assessment/Plan:  Plan will be for a left total knee replacement. We are going to use old clearance, but we will get new blood work, chest x-ray, EKG to make sure there has not been any changes since last time.   She states that she has not had any change in her medical condition and outpatient surgery and will review everything and determine if she is a good candidate at that point for left knee replacement. As part of continued conservative pain management options the patient was advised to utilize Tylenol or OTC NSAIDS as long as it is not medically contraindicated. Return to Office: Follow-up and Dispositions    · Return for [facility] for surgery. Scribed by Matt Coronado MD as dictated by Frieda Samuels. Carlos Osullivan MD.  Documentation True and Accepted Erasmo Osullivan MD

## 2021-03-09 NOTE — PATIENT INSTRUCTIONS
Knee Arthritis: Care Instructions Your Care Instructions Knee arthritis is a breakdown of the cartilage that cushions your knee joint. When the cartilage wears down, your bones rub against each other. This causes pain and stiffness. Knee arthritis tends to get worse with time. Treatment for knee arthritis involves reducing pain, making the leg muscles stronger, and staying at a healthy body weight. The treatment usually does not improve the health of the cartilage, but it can reduce pain and improve how well your knee works. You can take simple measures to protect your knee joints, ease your pain, and help you stay active. Follow-up care is a key part of your treatment and safety. Be sure to make and go to all appointments, and call your doctor if you are having problems. It's also a good idea to know your test results and keep a list of the medicines you take. How can you care for yourself at home? · Know that knee arthritis will cause more pain on some days than on others. · Stay at a healthy weight. Lose weight if you are overweight. When you stand up, the pressure on your knees from every pound of body weight is multiplied four times. So if you lose 10 pounds, you will reduce the pressure on your knees by 40 pounds. · Talk to your doctor or physical therapist about exercises that will help ease joint pain. ? Stretch to help prevent stiffness and to prevent injury before you exercise. You may enjoy gentle forms of yoga to help keep your knee joints and muscles flexible. ? Walk instead of jog. 
? Ride a bike. This makes your thigh muscles stronger and takes pressure off your knee. ? Wear well-fitting and comfortable shoes. ? Exercise in chest-deep water. This can help you exercise longer with less pain. ? Avoid exercises that include squatting or kneeling. They can put a lot of strain on your knees. ? Talk to your doctor to make sure that the exercise you do is not making the arthritis worse. · Do not sit for long periods of time. Try to walk once in a while to keep your knee from getting stiff. · Ask your doctor or physical therapist whether shoe inserts may reduce your arthritis pain. · If you can afford it, get new athletic shoes at least every year. This can help reduce the strain on your knees. · Use a device to help you do everyday activities. ? A cane or walking stick can help you keep your balance when you walk. Hold the cane or walking stick in the hand opposite the painful knee. ? If you feel like you may fall when you walk, try using crutches or a front-wheeled walker. These can prevent falls that could cause more damage to your knee. ? A knee brace may help keep your knee stable and prevent pain. ? You also can use other things to make life easier, such as a higher toilet seat and handrails in the bathtub or shower. · Take pain medicines exactly as directed. ? Do not wait until you are in severe pain. You will get better results if you take it sooner. ? If you are not taking a prescription pain medicine, take an over-the-counter medicine such as acetaminophen (Tylenol), ibuprofen (Advil, Motrin), or naproxen (Aleve). Read and follow all instructions on the label. ? Do not take two or more pain medicines at the same time unless the doctor told you to. Many pain medicines have acetaminophen, which is Tylenol. Too much acetaminophen (Tylenol) can be harmful. ? Tell your doctor if you take a blood thinner, have diabetes, or have allergies to shellfish. · Ask your doctor if you might benefit from a shot of steroid medicine into your knee. This may provide pain relief for several months. · Many people take the supplements glucosamine and chondroitin for osteoarthritis. Some people feel they help, but the medical research does not show that they work. Talk to your doctor before you take these supplements. When should you call for help? Call your doctor now or seek immediate medical care if: 
  · You have sudden swelling, warmth, or pain in your knee.  
  · You have knee pain and a fever or rash.  
  · You have such bad pain that you cannot use your knee. Watch closely for changes in your health, and be sure to contact your doctor if you have any problems. Where can you learn more? Go to http://www.gray.com/ Enter Q862 in the search box to learn more about \"Knee Arthritis: Care Instructions. \" Current as of: December 9, 2019               Content Version: 12.6 © 2778-8266 [employer]. Care instructions adapted under license by [employer] (which disclaims liability or warranty for this information). If you have questions about a medical condition or this instruction, always ask your healthcare professional. [address] any warranty or liability for your use of this information.

## 2021-03-23 ENCOUNTER — HOSPITAL ENCOUNTER (OUTPATIENT)
Dept: GENERAL RADIOLOGY | Age: 71
Discharge: HOME | End: 2021-03-23
Attending: ORTHOPAEDIC SURGERY
Payer: MEDICARE

## 2021-03-23 ENCOUNTER — HOSPITAL ENCOUNTER (OUTPATIENT)
Dept: LAB | Age: 71
Discharge: HOME | End: 2021-03-23
Payer: MEDICARE

## 2021-03-23 ENCOUNTER — HOSPITAL ENCOUNTER (OUTPATIENT)
Dept: NON INVASIVE DIAGNOSTICS | Age: 71
Discharge: HOME | End: 2021-03-23
Payer: MEDICARE

## 2021-03-23 DIAGNOSIS — G89.29 CHRONIC PAIN OF LEFT KNEE: ICD-10-CM

## 2021-03-23 DIAGNOSIS — M25.562 CHRONIC PAIN OF LEFT KNEE: ICD-10-CM

## 2021-03-23 DIAGNOSIS — M17.12 PRIMARY OSTEOARTHRITIS OF LEFT KNEE: ICD-10-CM

## 2021-03-23 LAB
ATRIAL RATE: 69 BPM
CALCULATED R AXIS, ECG10: 41 DEGREES
CALCULATED T AXIS, ECG11: 103 DEGREES
DIAGNOSIS, 93000: NORMAL
P-R INTERVAL, ECG05: 140 MS
Q-T INTERVAL, ECG07: 418 MS
QRS DURATION, ECG06: 72 MS
QTC CALCULATION (BEZET), ECG08: 447 MS
VENTRICULAR RATE, ECG03: 69 BPM

## 2021-03-23 PROCEDURE — 99001 SPECIMEN HANDLING PT-LAB: CPT

## 2021-03-23 PROCEDURE — 71046 X-RAY EXAM CHEST 2 VIEWS: CPT

## 2021-03-23 PROCEDURE — 93005 ELECTROCARDIOGRAM TRACING: CPT

## 2021-03-24 LAB — CREATININE, EXTERNAL: 0.76

## 2021-04-01 ENCOUNTER — ANESTHESIA EVENT (OUTPATIENT)
Dept: SURGERY | Age: 71
End: 2021-04-01
Payer: MEDICARE

## 2021-04-01 ENCOUNTER — OFFICE VISIT (OUTPATIENT)
Dept: ORTHOPEDIC SURGERY | Age: 71
End: 2021-04-01
Payer: MEDICARE

## 2021-04-01 ENCOUNTER — HOSPITAL ENCOUNTER (OUTPATIENT)
Dept: PREADMISSION TESTING | Age: 71
Discharge: HOME | End: 2021-04-01
Payer: MEDICARE

## 2021-04-01 DIAGNOSIS — M25.562 LEFT KNEE PAIN, UNSPECIFIED CHRONICITY: Primary | ICD-10-CM

## 2021-04-01 DIAGNOSIS — M17.12 OSTEOARTHRITIS OF LEFT KNEE, UNSPECIFIED OSTEOARTHRITIS TYPE: ICD-10-CM

## 2021-04-01 LAB — SARS-COV-2, COV2: NORMAL

## 2021-04-01 PROCEDURE — 1090F PRES/ABSN URINE INCON ASSESS: CPT | Performed by: ORTHOPAEDIC SURGERY

## 2021-04-01 PROCEDURE — 1101F PT FALLS ASSESS-DOCD LE1/YR: CPT | Performed by: ORTHOPAEDIC SURGERY

## 2021-04-01 PROCEDURE — G8399 PT W/DXA RESULTS DOCUMENT: HCPCS | Performed by: ORTHOPAEDIC SURGERY

## 2021-04-01 PROCEDURE — 99214 OFFICE O/P EST MOD 30 MIN: CPT | Performed by: ORTHOPAEDIC SURGERY

## 2021-04-01 PROCEDURE — G8432 DEP SCR NOT DOC, RNG: HCPCS | Performed by: ORTHOPAEDIC SURGERY

## 2021-04-01 PROCEDURE — G8427 DOCREV CUR MEDS BY ELIG CLIN: HCPCS | Performed by: ORTHOPAEDIC SURGERY

## 2021-04-01 PROCEDURE — U0003 INFECTIOUS AGENT DETECTION BY NUCLEIC ACID (DNA OR RNA); SEVERE ACUTE RESPIRATORY SYNDROME CORONAVIRUS 2 (SARS-COV-2) (CORONAVIRUS DISEASE [COVID-19]), AMPLIFIED PROBE TECHNIQUE, MAKING USE OF HIGH THROUGHPUT TECHNOLOGIES AS DESCRIBED BY CMS-2020-01-R: HCPCS

## 2021-04-01 PROCEDURE — G8417 CALC BMI ABV UP PARAM F/U: HCPCS | Performed by: ORTHOPAEDIC SURGERY

## 2021-04-01 PROCEDURE — G8536 NO DOC ELDER MAL SCRN: HCPCS | Performed by: ORTHOPAEDIC SURGERY

## 2021-04-01 PROCEDURE — 3017F COLORECTAL CA SCREEN DOC REV: CPT | Performed by: ORTHOPAEDIC SURGERY

## 2021-04-01 RX ORDER — LOSARTAN POTASSIUM 100 MG/1
TABLET ORAL
COMMUNITY
Start: 2021-03-30

## 2021-04-01 RX ORDER — TRAMADOL HYDROCHLORIDE 50 MG/1
50 TABLET ORAL
Qty: 30 TAB | Refills: 0 | Status: SHIPPED | OUTPATIENT
Start: 2021-04-01 | End: 2021-05-13

## 2021-04-01 RX ORDER — CEPHALEXIN 500 MG/1
500 CAPSULE ORAL EVERY 8 HOURS
Qty: 3 CAP | Refills: 0 | Status: SHIPPED | OUTPATIENT
Start: 2021-04-01 | End: 2021-04-02

## 2021-04-01 RX ORDER — ONDANSETRON 4 MG/1
4 TABLET, ORALLY DISINTEGRATING ORAL
Qty: 10 TAB | Refills: 0 | Status: SHIPPED | OUTPATIENT
Start: 2021-04-01

## 2021-04-01 RX ORDER — VERAPAMIL HYDROCHLORIDE 240 MG/1
TABLET, FILM COATED, EXTENDED RELEASE ORAL
COMMUNITY
Start: 2021-03-11

## 2021-04-01 RX ORDER — ATORVASTATIN CALCIUM 40 MG/1
TABLET, FILM COATED ORAL
COMMUNITY
Start: 2021-03-30

## 2021-04-01 RX ORDER — MAGNESIUM SULFATE 100 %
4 CRYSTALS MISCELLANEOUS AS NEEDED
Status: CANCELLED | OUTPATIENT
Start: 2021-04-07

## 2021-04-01 RX ORDER — DEXTROSE 50 % IN WATER (D50W) INTRAVENOUS SYRINGE
25-50 AS NEEDED
Status: CANCELLED | OUTPATIENT
Start: 2021-04-07

## 2021-04-01 NOTE — PROGRESS NOTES
Name: Magdiel Scott    : 1950     Service Dept: [address] [place] [address]    Patient's Pharmacies:    [address] [facility] [place], [place] [address]  Phone: [phone] Fax: [phone]       Chief Complaint   Patient presents with    Pre-op Exam    Knee Pain        There were no vitals taken for this visit. Allergies   Allergen Reactions    Acetaminophen Rash     Tylenol #3      Current Outpatient Medications   Medication Sig Dispense Refill    atorvastatin (LIPITOR) 40 mg tablet       losartan (COZAAR) 100 mg tablet       verapamil ER (CALAN-SR) 240 mg CR tablet TAKE 1 TABLET BY MOUTH ONCE DAILY FOR 90 DAYS      cefUROXime (CEFTIN) 500 mg tablet TAKE 1 TABLET BY MOUTH TWICE DAILY      oxybutynin chloride XL (DITROPAN XL) 5 mg CR tablet Take 5 mg by mouth daily.  oxybutynin chloride XL (DITROPAN XL) 10 mg CR tablet Take 1 Tab by mouth daily. 90 Tab 6    diazepam (VALIUM) 5 mg tablet 5 mg every eight (8) hours as needed.  celecoxib (CELEBREX) 200 mg capsule 200 mg daily.  senna-docusate (PERICOLACE) 8.6-50 mg per tablet Take 2 Tabs by Mouth Every Morning.         Patient Active Problem List   Diagnosis Code    Urinary frequency R35.0    UTI (urinary tract infection) N39.0    Severe obesity ([address] E66.01      Family History   Problem Relation Age of Onset    Heart Disease Mother     Heart Attack Mother     Cancer Mother       Social History     Socioeconomic History    Marital status:      Spouse name: Not on file    Number of children: Not on file    Years of education: Not on file    Highest education level: Not on file   Tobacco Use    Smoking status: Never Smoker    Smokeless tobacco: Never Used   Substance and Sexual Activity    Alcohol use: No     Alcohol/week: 0.0 standard drinks    Drug use: No      Past Surgical History:   Procedure Laterality Date    HX HYSTERECTOMY      HX ORTHOPAEDIC      Spine Surgery       Past Medical History:   Diagnosis Date    Abnormal EKG     Back pain     High cholesterol     Hypertension     Osteopenia     Sciatica     Urinary frequency     UTI (urinary tract infection)         I have reviewed and agree with PFSH and ROS and intake form in chart and the record furthermore I have reviewed prior medical record(s) regarding this patients care during this appointment. Review of Systems:   Patient is a pleasant appearing individual, appropriately dressed, well hydrated, well nourished, who is alert, appropriately oriented for age, and in no acute distress with a normal gait and normal affect who does not appear to be in any significant pain. Physical Exam:  Left Knee -Decrease range of motion with flexion, Knee arc of greater than 50 degrees, Some crepitation, Grossly neurovascularly intact, Good cap refill, No skin lesion, Moderate swelling, No gross instability, Some quadriceps weakness, Kellgren and Arias at least grade 3    Right Knee - Full Range of Motion, No crepitation, Grossly neurovascularly intact, Good cap refill, No skin lesion, No swelling, No gross instability, No quadriceps weakness   Encounter Diagnoses     ICD-10-CM ICD-9-CM   1. Left knee pain, unspecified chronicity  M25.562 719.46   2. Osteoarthritis of left knee, unspecified osteoarthritis type  M17.12 715.96       HPI:  The patient is here with a chief complaint of left knee pain, sharp, throbbing pain. It has been the same. Nothing has helped. Pain is 10/10. ROS:  10-point review of systems is unremarkable. X-rays of the left knee are positive for severe OA. Assessment/Plan:  Plan will be for left total knee replacement with tramadol, Keflex and aspirin, and we will try to match it up to her right knee.       As part of continued conservative pain management options the patient was advised to utilize Tylenol or OTC NSAIDS as long as it is not medically contraindicated. Return to Office: Follow-up and Dispositions    · Return for already scheduled for surgery. Scribed by [place] Tam LPN as dictated by [facility] TREVOR Lutz MD.  Documentation True and Accepted [facility] TREVOR Lutz MD

## 2021-04-01 NOTE — H&P (VIEW-ONLY)
Name: Joe Monahan    : 1950     Service Dept: [address] [place] [address]    Patient's Pharmacies:    [address] [facility]  [facility] [address]  Phone: [phone] Fax: [phone]       Chief Complaint   Patient presents with    Pre-op Exam    Knee Pain        There were no vitals taken for this visit. Allergies   Allergen Reactions    Acetaminophen Rash     Tylenol #3      Current Outpatient Medications   Medication Sig Dispense Refill    atorvastatin (LIPITOR) 40 mg tablet       losartan (COZAAR) 100 mg tablet       verapamil ER (CALAN-SR) 240 mg CR tablet TAKE 1 TABLET BY MOUTH ONCE DAILY FOR 90 DAYS      cefUROXime (CEFTIN) 500 mg tablet TAKE 1 TABLET BY MOUTH TWICE DAILY      oxybutynin chloride XL (DITROPAN XL) 5 mg CR tablet Take 5 mg by mouth daily.  oxybutynin chloride XL (DITROPAN XL) 10 mg CR tablet Take 1 Tab by mouth daily. 90 Tab 6    diazepam (VALIUM) 5 mg tablet 5 mg every eight (8) hours as needed.  celecoxib (CELEBREX) 200 mg capsule 200 mg daily.  senna-docusate (PERICOLACE) 8.6-50 mg per tablet Take 2 Tabs by Mouth Every Morning.         Patient Active Problem List   Diagnosis Code    Urinary frequency R35.0    UTI (urinary tract infection) N39.0    Severe obesity ([address] E66.01      Family History   Problem Relation Age of Onset    Heart Disease Mother     Heart Attack Mother     Cancer Mother       Social History     Socioeconomic History    Marital status:      Spouse name: Not on file    Number of children: Not on file    Years of education: Not on file    Highest education level: Not on file   Tobacco Use    Smoking status: Never Smoker    Smokeless tobacco: Never Used   Substance and Sexual Activity    Alcohol use: No     Alcohol/week: 0.0 standard drinks    Drug use: No      Past Surgical History:   Procedure Laterality Date    HX HYSTERECTOMY      HX ORTHOPAEDIC      Spine Surgery       Past Medical History:   Diagnosis Date    Abnormal EKG     Back pain     High cholesterol     Hypertension     Osteopenia     Sciatica     Urinary frequency     UTI (urinary tract infection)         I have reviewed and agree with PFSH and ROS and intake form in chart and the record furthermore I have reviewed prior medical record(s) regarding this patients care during this appointment. Review of Systems:   Patient is a pleasant appearing individual, appropriately dressed, well hydrated, well nourished, who is alert, appropriately oriented for age, and in no acute distress with a normal gait and normal affect who does not appear to be in any significant pain. Physical Exam:  Left Knee -Decrease range of motion with flexion, Knee arc of greater than 50 degrees, Some crepitation, Grossly neurovascularly intact, Good cap refill, No skin lesion, Moderate swelling, No gross instability, Some quadriceps weakness, Kellgren and Arias at least grade 3    Right Knee - Full Range of Motion, No crepitation, Grossly neurovascularly intact, Good cap refill, No skin lesion, No swelling, No gross instability, No quadriceps weakness   Encounter Diagnoses     ICD-10-CM ICD-9-CM   1. Left knee pain, unspecified chronicity  M25.562 719.46   2. Osteoarthritis of left knee, unspecified osteoarthritis type  M17.12 715.96       HPI:  The patient is here with a chief complaint of left knee pain, sharp, throbbing pain. It has been the same. Nothing has helped. Pain is 10/10. ROS:  10-point review of systems is unremarkable. X-rays of the left knee are positive for severe OA. Assessment/Plan:  Plan will be for left total knee replacement with tramadol, Keflex and aspirin, and we will try to match it up to her right knee.       As part of continued conservative pain management options the patient was advised to utilize Tylenol or OTC NSAIDS as long as it is not medically contraindicated. Return to Office: Follow-up and Dispositions    · Return for already scheduled for surgery. Scribed by Yamini Philippe LPN as dictated by [facility] [place] TREVOR Eric MD.  Documentation True and Accepted Erasmo Eric MD

## 2021-04-01 NOTE — PATIENT INSTRUCTIONS
Knee Pain or Injury: Care Instructions Your Care Instructions Injuries are a common cause of knee problems. Sudden (acute) injuries may be caused by a direct blow to the knee. They can also be caused by abnormal twisting, bending, or falling on the knee. Pain, bruising, or swelling may be severe, and may start within minutes of the injury. Overuse is another cause of knee pain. Other causes are climbing stairs, kneeling, and other activities that use the knee. Everyday wear and tear, especially as you get older, also can cause knee pain. Rest, along with home treatment, often relieves pain and allows your knee to heal. If you have a serious knee injury, you may need tests and treatment. Follow-up care is a key part of your treatment and safety. Be sure to make and go to all appointments, and call your doctor if you are having problems. It's also a good idea to know your test results and keep a list of the medicines you take. How can you care for yourself at home? · Be safe with medicines. Read and follow all instructions on the label. ? If the doctor gave you a prescription medicine for pain, take it as prescribed. ? If you are not taking a prescription pain medicine, ask your doctor if you can take an over-the-counter medicine. · Rest and protect your knee. Take a break from any activity that may cause pain. · Put ice or a cold pack on your knee for 10 to 20 minutes at a time. Put a thin cloth between the ice and your skin. · Prop up a sore knee on a pillow when you ice it or anytime you sit or lie down for the next 3 days. Try to keep it above the level of your heart. This will help reduce swelling. · If your knee is not swollen, you can put moist heat, a heating pad, or a warm cloth on your knee. · If your doctor recommends an elastic bandage, sleeve, or other type of support for your knee, wear it as directed.  
· Follow your doctor's instructions about how much weight you can put on your leg. Use a cane, crutches, or a walker as instructed. · Follow your doctor's instructions about activity during your healing process. If you can do mild exercise, slowly increase your activity. · Reach and stay at a healthy weight. Extra weight can strain the joints, especially the knees and hips, and make the pain worse. Losing even a few pounds may help. When should you call for help? Call 911 anytime you think you may need emergency care. For example, call if: 
  · You have symptoms of a blood clot in your lung (called a pulmonary embolism). These may include: 
? Sudden chest pain. ? Trouble breathing. ? Coughing up blood. Call your doctor now or seek immediate medical care if: 
  · You have severe or increasing pain.  
  · Your leg or foot turns cold or changes color.  
  · You cannot stand or put weight on your knee.  
  · Your knee looks twisted or bent out of shape.  
  · You cannot move your knee.  
  · You have signs of infection, such as: 
? Increased pain, swelling, warmth, or redness. ? Red streaks leading from the knee. ? Pus draining from a place on your knee. ? A fever.  
  · You have signs of a blood clot in your leg (called a deep vein thrombosis), such as: 
? Pain in your calf, back of the knee, thigh, or groin. ? Redness and swelling in your leg or groin. Watch closely for changes in your health, and be sure to contact your doctor if: 
  · You have tingling, weakness, or numbness in your knee.  
  · You have any new symptoms, such as swelling.  
  · You have bruises from a knee injury that last longer than 2 weeks.  
  · You do not get better as expected. Where can you learn more? Go to http://www.gray.com/ Enter K195 in the search box to learn more about \"Knee Pain or Injury: Care Instructions. \" Current as of: February 26, 2020               Content Version: 12.8 © 2666-5555 [employer].   
Care instructions adapted under license by [employer] (which disclaims liability or warranty for this information). If you have questions about a medical condition or this instruction, always ask your healthcare professional. [address] any warranty or liability for your use of this information.

## 2021-04-02 LAB — SARS-COV-2, COV2NT: NOT DETECTED

## 2021-04-03 LAB
BACTERIA SPEC CULT: ABNORMAL
BACTERIA SPEC CULT: ABNORMAL
SPECIAL REQUESTS,SREQ: ABNORMAL

## 2021-04-07 ENCOUNTER — APPOINTMENT (OUTPATIENT)
Dept: GENERAL RADIOLOGY | Age: 71
End: 2021-04-07
Attending: ORTHOPAEDIC SURGERY
Payer: MEDICARE

## 2021-04-07 ENCOUNTER — HOSPITAL ENCOUNTER (OUTPATIENT)
Age: 71
Discharge: HOME | End: 2021-04-07
Attending: ORTHOPAEDIC SURGERY | Admitting: ORTHOPAEDIC SURGERY
Payer: MEDICARE

## 2021-04-07 ENCOUNTER — ANESTHESIA (OUTPATIENT)
Dept: SURGERY | Age: 71
End: 2021-04-07
Payer: MEDICARE

## 2021-04-07 VITALS
HEART RATE: 86 BPM | RESPIRATION RATE: 14 BRPM | WEIGHT: 185 LBS | SYSTOLIC BLOOD PRESSURE: 162 MMHG | DIASTOLIC BLOOD PRESSURE: 91 MMHG | TEMPERATURE: 97.2 F | BODY MASS INDEX: 34.93 KG/M2 | OXYGEN SATURATION: 100 % | HEIGHT: 61 IN

## 2021-04-07 PROBLEM — M17.9 KNEE OSTEOARTHRITIS: Status: ACTIVE | Noted: 2021-04-07

## 2021-04-07 LAB
ABO + RH BLD: NORMAL
BLOOD GROUP ANTIBODIES SERPL: NEGATIVE
SPECIMEN EXP DATE BLD: NORMAL

## 2021-04-07 PROCEDURE — 77030031139 HC SUT VCRL2 J&J -A: Performed by: ORTHOPAEDIC SURGERY

## 2021-04-07 PROCEDURE — 74011250636 HC RX REV CODE- 250/636: Performed by: NURSE ANESTHETIST, CERTIFIED REGISTERED

## 2021-04-07 PROCEDURE — 74011000272 HC RX REV CODE- 272: Performed by: ORTHOPAEDIC SURGERY

## 2021-04-07 PROCEDURE — 73560 X-RAY EXAM OF KNEE 1 OR 2: CPT

## 2021-04-07 PROCEDURE — 77030002982 HC SUT POLYSRB J&J -A: Performed by: ORTHOPAEDIC SURGERY

## 2021-04-07 PROCEDURE — 77030002933 HC SUT MCRYL J&J -A: Performed by: ORTHOPAEDIC SURGERY

## 2021-04-07 PROCEDURE — 76060000035 HC ANESTHESIA 2 TO 2.5 HR: Performed by: ORTHOPAEDIC SURGERY

## 2021-04-07 PROCEDURE — 77030042352 HC GARMT COMPR -B: Performed by: ORTHOPAEDIC SURGERY

## 2021-04-07 PROCEDURE — 77030013708 HC HNDPC SUC IRR PULS STRY –B: Performed by: ORTHOPAEDIC SURGERY

## 2021-04-07 PROCEDURE — C1776 JOINT DEVICE (IMPLANTABLE): HCPCS | Performed by: ORTHOPAEDIC SURGERY

## 2021-04-07 PROCEDURE — 2709999900 HC NON-CHARGEABLE SUPPLY: Performed by: ORTHOPAEDIC SURGERY

## 2021-04-07 PROCEDURE — 77030029372 HC ADH SKN CLSR PRINEO J&J -C: Performed by: ORTHOPAEDIC SURGERY

## 2021-04-07 PROCEDURE — 74011000258 HC RX REV CODE- 258: Performed by: ORTHOPAEDIC SURGERY

## 2021-04-07 PROCEDURE — 74011000250 HC RX REV CODE- 250: Performed by: ORTHOPAEDIC SURGERY

## 2021-04-07 PROCEDURE — 76010000131 HC OR TIME 2 TO 2.5 HR: Performed by: ORTHOPAEDIC SURGERY

## 2021-04-07 PROCEDURE — 77030018673: Performed by: ORTHOPAEDIC SURGERY

## 2021-04-07 PROCEDURE — 77030039147 HC PWDR HEMSTS SURGICEL JNJ -D: Performed by: ORTHOPAEDIC SURGERY

## 2021-04-07 PROCEDURE — 74011250636 HC RX REV CODE- 250/636: Performed by: ORTHOPAEDIC SURGERY

## 2021-04-07 PROCEDURE — 74011250637 HC RX REV CODE- 250/637: Performed by: ORTHOPAEDIC SURGERY

## 2021-04-07 PROCEDURE — C1713 ANCHOR/SCREW BN/BN,TIS/BN: HCPCS | Performed by: ORTHOPAEDIC SURGERY

## 2021-04-07 PROCEDURE — 77030040375: Performed by: ORTHOPAEDIC SURGERY

## 2021-04-07 PROCEDURE — 77030042022 HC SYST COLD THRPY BREG -B: Performed by: ORTHOPAEDIC SURGERY

## 2021-04-07 PROCEDURE — 74011250637 HC RX REV CODE- 250/637: Performed by: NURSE ANESTHETIST, CERTIFIED REGISTERED

## 2021-04-07 PROCEDURE — 77030000032 HC CUF TRNQT ZIMM -B: Performed by: ORTHOPAEDIC SURGERY

## 2021-04-07 PROCEDURE — 76210000063 HC OR PH I REC FIRST 0.5 HR: Performed by: ORTHOPAEDIC SURGERY

## 2021-04-07 PROCEDURE — 77030010783 HC BOWL MX BN CEM J&J -B: Performed by: ORTHOPAEDIC SURGERY

## 2021-04-07 PROCEDURE — 97161 PT EVAL LOW COMPLEX 20 MIN: CPT

## 2021-04-07 PROCEDURE — 77030006835 HC BLD SAW SAG STRY -B: Performed by: ORTHOPAEDIC SURGERY

## 2021-04-07 PROCEDURE — 74011000250 HC RX REV CODE- 250: Performed by: NURSE ANESTHETIST, CERTIFIED REGISTERED

## 2021-04-07 PROCEDURE — 77030011266 HC ELECTRD BLD INSL COVD -A: Performed by: ORTHOPAEDIC SURGERY

## 2021-04-07 PROCEDURE — 76210000025 HC REC RM PH II 3 TO 3.5 HR: Performed by: ORTHOPAEDIC SURGERY

## 2021-04-07 PROCEDURE — 77030041690 HC SYS PINNING KN JNJ -D: Performed by: ORTHOPAEDIC SURGERY

## 2021-04-07 PROCEDURE — 77030006812 HC BLD SAW RECIP STRY -B: Performed by: ORTHOPAEDIC SURGERY

## 2021-04-07 PROCEDURE — 86900 BLOOD TYPING SEROLOGIC ABO: CPT

## 2021-04-07 PROCEDURE — 97116 GAIT TRAINING THERAPY: CPT

## 2021-04-07 DEVICE — COMPONENT FEM SZ 6 L KNEE NAR POST STBL CEM ATTUNE: Type: IMPLANTABLE DEVICE | Site: KNEE | Status: FUNCTIONAL

## 2021-04-07 DEVICE — CEMENT BNE 40GM FULL DOSE PMMA W/ GENT HI VISC RADPQ LNG: Type: IMPLANTABLE DEVICE | Site: KNEE | Status: FUNCTIONAL

## 2021-04-07 DEVICE — COMPONENT PAT DIA35MM KNEE POLY DOME CEM MEDIALIZED ATTUNE: Type: IMPLANTABLE DEVICE | Site: KNEE | Status: FUNCTIONAL

## 2021-04-07 DEVICE — KNEE K1 TOT HEMI STD CEM IMPL CAPPED SYNTHES: Type: IMPLANTABLE DEVICE | Status: FUNCTIONAL

## 2021-04-07 DEVICE — INSERT TIB SZ 6 THK5MM KNEE POST STBL ROT PLATFRM ATTUNE: Type: IMPLANTABLE DEVICE | Site: KNEE | Status: FUNCTIONAL

## 2021-04-07 DEVICE — BASEPLATE TIB SZ 5 ROT PLATFRM CO CHROM MOLYBDENUM TI ALLY: Type: IMPLANTABLE DEVICE | Site: KNEE | Status: FUNCTIONAL

## 2021-04-07 RX ORDER — SODIUM CHLORIDE 0.9 % (FLUSH) 0.9 %
5-40 SYRINGE (ML) INJECTION AS NEEDED
Status: DISCONTINUED | OUTPATIENT
Start: 2021-04-07 | End: 2021-04-07 | Stop reason: HOSPADM

## 2021-04-07 RX ORDER — SODIUM CHLORIDE, SODIUM LACTATE, POTASSIUM CHLORIDE, CALCIUM CHLORIDE 600; 310; 30; 20 MG/100ML; MG/100ML; MG/100ML; MG/100ML
25 INJECTION, SOLUTION INTRAVENOUS CONTINUOUS
Status: DISCONTINUED | OUTPATIENT
Start: 2021-04-07 | End: 2021-04-07 | Stop reason: HOSPADM

## 2021-04-07 RX ORDER — BUPIVACAINE HYDROCHLORIDE 7.5 MG/ML
INJECTION, SOLUTION EPIDURAL; RETROBULBAR
Status: SHIPPED | OUTPATIENT
Start: 2021-04-07 | End: 2021-04-07

## 2021-04-07 RX ORDER — HYDROMORPHONE HYDROCHLORIDE 2 MG/1
2 TABLET ORAL
Status: DISCONTINUED | OUTPATIENT
Start: 2021-04-07 | End: 2021-04-07 | Stop reason: HOSPADM

## 2021-04-07 RX ORDER — MIDAZOLAM HYDROCHLORIDE 1 MG/ML
INJECTION, SOLUTION INTRAMUSCULAR; INTRAVENOUS
Status: SHIPPED | OUTPATIENT
Start: 2021-04-07 | End: 2021-04-07

## 2021-04-07 RX ORDER — DEXTROSE 50 % IN WATER (D50W) INTRAVENOUS SYRINGE
25-50 AS NEEDED
Status: CANCELLED | OUTPATIENT
Start: 2021-04-07

## 2021-04-07 RX ORDER — TRANEXAMIC ACID 100 MG/ML
INJECTION, SOLUTION INTRAVENOUS AS NEEDED
Status: DISCONTINUED | OUTPATIENT
Start: 2021-04-07 | End: 2021-04-07 | Stop reason: HOSPADM

## 2021-04-07 RX ORDER — SODIUM CHLORIDE 0.9 % (FLUSH) 0.9 %
5-40 SYRINGE (ML) INJECTION EVERY 8 HOURS
Status: DISCONTINUED | OUTPATIENT
Start: 2021-04-07 | End: 2021-04-07 | Stop reason: HOSPADM

## 2021-04-07 RX ORDER — CELECOXIB 200 MG/1
400 CAPSULE ORAL
Status: COMPLETED | OUTPATIENT
Start: 2021-04-07 | End: 2021-04-07

## 2021-04-07 RX ORDER — BUPIVACAINE HYDROCHLORIDE 2.5 MG/ML
INJECTION, SOLUTION INFILTRATION; PERINEURAL AS NEEDED
Status: DISCONTINUED | OUTPATIENT
Start: 2021-04-07 | End: 2021-04-07 | Stop reason: HOSPADM

## 2021-04-07 RX ORDER — BUPIVACAINE HYDROCHLORIDE 5 MG/ML
INJECTION, SOLUTION EPIDURAL; INTRACAUDAL
Status: SHIPPED | OUTPATIENT
Start: 2021-04-07 | End: 2021-04-07

## 2021-04-07 RX ORDER — ASPIRIN 325 MG
325 TABLET, DELAYED RELEASE (ENTERIC COATED) ORAL 2 TIMES DAILY
Status: CANCELLED | OUTPATIENT
Start: 2021-04-08

## 2021-04-07 RX ORDER — ONDANSETRON 2 MG/ML
4 INJECTION INTRAMUSCULAR; INTRAVENOUS
Status: DISCONTINUED | OUTPATIENT
Start: 2021-04-07 | End: 2021-04-07 | Stop reason: HOSPADM

## 2021-04-07 RX ORDER — ONDANSETRON 2 MG/ML
4 INJECTION INTRAMUSCULAR; INTRAVENOUS ONCE
Status: DISCONTINUED | OUTPATIENT
Start: 2021-04-07 | End: 2021-04-07 | Stop reason: HOSPADM

## 2021-04-07 RX ORDER — SODIUM CHLORIDE, SODIUM LACTATE, POTASSIUM CHLORIDE, CALCIUM CHLORIDE 600; 310; 30; 20 MG/100ML; MG/100ML; MG/100ML; MG/100ML
25 INJECTION, SOLUTION INTRAVENOUS CONTINUOUS
Status: CANCELLED | OUTPATIENT
Start: 2021-04-07 | End: 2021-04-08

## 2021-04-07 RX ORDER — FACIAL-BODY WIPES
10 EACH TOPICAL DAILY PRN
Status: CANCELLED | OUTPATIENT
Start: 2021-04-07

## 2021-04-07 RX ORDER — SODIUM CHLORIDE 0.9 % (FLUSH) 0.9 %
5-40 SYRINGE (ML) INJECTION EVERY 8 HOURS
Status: CANCELLED | OUTPATIENT
Start: 2021-04-07

## 2021-04-07 RX ORDER — FLUMAZENIL 0.1 MG/ML
0.2 INJECTION INTRAVENOUS
Status: DISCONTINUED | OUTPATIENT
Start: 2021-04-07 | End: 2021-04-07 | Stop reason: HOSPADM

## 2021-04-07 RX ORDER — DIPHENHYDRAMINE HYDROCHLORIDE 50 MG/ML
12.5 INJECTION, SOLUTION INTRAMUSCULAR; INTRAVENOUS
Status: DISCONTINUED | OUTPATIENT
Start: 2021-04-07 | End: 2021-04-07 | Stop reason: HOSPADM

## 2021-04-07 RX ORDER — KETOROLAC TROMETHAMINE 30 MG/ML
15 INJECTION, SOLUTION INTRAMUSCULAR; INTRAVENOUS
Status: DISCONTINUED | OUTPATIENT
Start: 2021-04-07 | End: 2021-04-07 | Stop reason: HOSPADM

## 2021-04-07 RX ORDER — NALOXONE HYDROCHLORIDE 0.4 MG/ML
0.1 INJECTION, SOLUTION INTRAMUSCULAR; INTRAVENOUS; SUBCUTANEOUS
Status: DISCONTINUED | OUTPATIENT
Start: 2021-04-07 | End: 2021-04-07 | Stop reason: HOSPADM

## 2021-04-07 RX ORDER — GABAPENTIN 300 MG/1
300 CAPSULE ORAL ONCE
Status: COMPLETED | OUTPATIENT
Start: 2021-04-07 | End: 2021-04-07

## 2021-04-07 RX ORDER — FENTANYL CITRATE 50 UG/ML
50 INJECTION, SOLUTION INTRAMUSCULAR; INTRAVENOUS
Status: DISCONTINUED | OUTPATIENT
Start: 2021-04-07 | End: 2021-04-07 | Stop reason: HOSPADM

## 2021-04-07 RX ORDER — NALOXONE HYDROCHLORIDE 0.4 MG/ML
0.4 INJECTION, SOLUTION INTRAMUSCULAR; INTRAVENOUS; SUBCUTANEOUS AS NEEDED
Status: CANCELLED | OUTPATIENT
Start: 2021-04-07

## 2021-04-07 RX ORDER — FENTANYL CITRATE 50 UG/ML
50 INJECTION, SOLUTION INTRAMUSCULAR; INTRAVENOUS AS NEEDED
Status: DISCONTINUED | OUTPATIENT
Start: 2021-04-07 | End: 2021-04-07 | Stop reason: HOSPADM

## 2021-04-07 RX ORDER — SENNOSIDES 8.6 MG/1
1 TABLET ORAL 2 TIMES DAILY
Status: CANCELLED | OUTPATIENT
Start: 2021-04-07

## 2021-04-07 RX ORDER — ALBUTEROL SULFATE 0.83 MG/ML
2.5 SOLUTION RESPIRATORY (INHALATION)
Status: DISCONTINUED | OUTPATIENT
Start: 2021-04-07 | End: 2021-04-07 | Stop reason: HOSPADM

## 2021-04-07 RX ORDER — DEXAMETHASONE SODIUM PHOSPHATE 4 MG/ML
INJECTION, SOLUTION INTRA-ARTICULAR; INTRALESIONAL; INTRAMUSCULAR; INTRAVENOUS; SOFT TISSUE
Status: SHIPPED | OUTPATIENT
Start: 2021-04-07 | End: 2021-04-07

## 2021-04-07 RX ORDER — ONDANSETRON 2 MG/ML
INJECTION INTRAMUSCULAR; INTRAVENOUS AS NEEDED
Status: DISCONTINUED | OUTPATIENT
Start: 2021-04-07 | End: 2021-04-07 | Stop reason: HOSPADM

## 2021-04-07 RX ORDER — KETAMINE HCL IN 0.9 % NACL 50 MG/5 ML
SYRINGE (ML) INTRAVENOUS AS NEEDED
Status: DISCONTINUED | OUTPATIENT
Start: 2021-04-07 | End: 2021-04-07 | Stop reason: HOSPADM

## 2021-04-07 RX ORDER — DIPHENHYDRAMINE HYDROCHLORIDE 50 MG/ML
12.5 INJECTION, SOLUTION INTRAMUSCULAR; INTRAVENOUS
Status: CANCELLED | OUTPATIENT
Start: 2021-04-07

## 2021-04-07 RX ORDER — PROPOFOL 10 MG/ML
INJECTION, EMULSION INTRAVENOUS AS NEEDED
Status: DISCONTINUED | OUTPATIENT
Start: 2021-04-07 | End: 2021-04-07 | Stop reason: HOSPADM

## 2021-04-07 RX ORDER — SODIUM CHLORIDE 0.9 % (FLUSH) 0.9 %
5-40 SYRINGE (ML) INJECTION AS NEEDED
Status: CANCELLED | OUTPATIENT
Start: 2021-04-07

## 2021-04-07 RX ADMIN — HYDROMORPHONE HYDROCHLORIDE 2 MG: 2 TABLET ORAL at 14:04

## 2021-04-07 RX ADMIN — ONDANSETRON 4 MG: 2 INJECTION INTRAMUSCULAR; INTRAVENOUS at 12:27

## 2021-04-07 RX ADMIN — BUPIVACAINE HYDROCHLORIDE 13.5 MG: 7.5 INJECTION, SOLUTION EPIDURAL; RETROBULBAR at 09:37

## 2021-04-07 RX ADMIN — SODIUM CHLORIDE, POTASSIUM CHLORIDE, SODIUM LACTATE AND CALCIUM CHLORIDE: 600; 310; 30; 20 INJECTION, SOLUTION INTRAVENOUS at 08:55

## 2021-04-07 RX ADMIN — PROPOFOL 20 MG: 10 INJECTION, EMULSION INTRAVENOUS at 10:03

## 2021-04-07 RX ADMIN — BUPIVACAINE HYDROCHLORIDE 20 ML: 5 INJECTION, SOLUTION EPIDURAL; INTRACAUDAL at 08:31

## 2021-04-07 RX ADMIN — PROPOFOL 20 MG: 10 INJECTION, EMULSION INTRAVENOUS at 10:09

## 2021-04-07 RX ADMIN — CEFAZOLIN SODIUM 2 G: 1 INJECTION, POWDER, FOR SOLUTION INTRAMUSCULAR; INTRAVENOUS at 09:40

## 2021-04-07 RX ADMIN — PROPOFOL 20 MG: 10 INJECTION, EMULSION INTRAVENOUS at 09:54

## 2021-04-07 RX ADMIN — PROPOFOL 50 MG: 10 INJECTION, EMULSION INTRAVENOUS at 10:22

## 2021-04-07 RX ADMIN — CELECOXIB 400 MG: 200 CAPSULE ORAL at 07:18

## 2021-04-07 RX ADMIN — GABAPENTIN 300 MG: 300 CAPSULE ORAL at 07:18

## 2021-04-07 RX ADMIN — PROPOFOL 30 MG: 10 INJECTION, EMULSION INTRAVENOUS at 09:40

## 2021-04-07 RX ADMIN — TRANEXAMIC ACID 1000 MG: 100 INJECTION, SOLUTION INTRAVENOUS at 09:48

## 2021-04-07 RX ADMIN — PROPOFOL 50 MG: 10 INJECTION, EMULSION INTRAVENOUS at 10:31

## 2021-04-07 RX ADMIN — PROPOFOL 20 MG: 10 INJECTION, EMULSION INTRAVENOUS at 09:52

## 2021-04-07 RX ADMIN — TRANEXAMIC ACID 1000 MG: 100 INJECTION, SOLUTION INTRAVENOUS at 10:34

## 2021-04-07 RX ADMIN — PROPOFOL 30 MG: 10 INJECTION, EMULSION INTRAVENOUS at 10:48

## 2021-04-07 RX ADMIN — FENTANYL CITRATE 50 MCG: 50 INJECTION, SOLUTION INTRAMUSCULAR; INTRAVENOUS at 11:48

## 2021-04-07 RX ADMIN — DEXAMETHASONE SODIUM PHOSPHATE 4 MG: 4 INJECTION, SOLUTION INTRAMUSCULAR; INTRAVENOUS at 08:31

## 2021-04-07 RX ADMIN — Medication 30 MG: at 09:45

## 2021-04-07 RX ADMIN — PROPOFOL 20 MG: 10 INJECTION, EMULSION INTRAVENOUS at 09:49

## 2021-04-07 RX ADMIN — PROPOFOL 20 MG: 10 INJECTION, EMULSION INTRAVENOUS at 09:58

## 2021-04-07 RX ADMIN — ONDANSETRON HYDROCHLORIDE 4 MG: 2 INJECTION, SOLUTION INTRAMUSCULAR; INTRAVENOUS at 10:02

## 2021-04-07 RX ADMIN — PROPOFOL 30 MG: 10 INJECTION, EMULSION INTRAVENOUS at 09:44

## 2021-04-07 RX ADMIN — SODIUM CHLORIDE, POTASSIUM CHLORIDE, SODIUM LACTATE AND CALCIUM CHLORIDE: 600; 310; 30; 20 INJECTION, SOLUTION INTRAVENOUS at 10:53

## 2021-04-07 RX ADMIN — KETOROLAC TROMETHAMINE 15 MG: 30 INJECTION, SOLUTION INTRAMUSCULAR at 12:31

## 2021-04-07 RX ADMIN — Medication 20 MG: at 09:54

## 2021-04-07 RX ADMIN — PROPOFOL 20 MG: 10 INJECTION, EMULSION INTRAVENOUS at 10:16

## 2021-04-07 RX ADMIN — PROPOFOL 50 MG: 10 INJECTION, EMULSION INTRAVENOUS at 10:38

## 2021-04-07 RX ADMIN — FENTANYL CITRATE 50 MCG: 50 INJECTION, SOLUTION INTRAMUSCULAR; INTRAVENOUS at 11:53

## 2021-04-07 RX ADMIN — PROPOFOL 20 MG: 10 INJECTION, EMULSION INTRAVENOUS at 10:06

## 2021-04-07 RX ADMIN — SODIUM CHLORIDE, POTASSIUM CHLORIDE, SODIUM LACTATE AND CALCIUM CHLORIDE 25 ML/HR: 600; 310; 30; 20 INJECTION, SOLUTION INTRAVENOUS at 07:18

## 2021-04-07 RX ADMIN — MIDAZOLAM 2 MG: 1 INJECTION INTRAMUSCULAR; INTRAVENOUS at 08:31

## 2021-04-07 NOTE — ANESTHESIA POSTPROCEDURE EVALUATION
Procedure(s):  LEFT TKA (HIGH BMI) (OUTPT).     regional, spinal, general - backup    Anesthesia Post Evaluation      Multimodal analgesia: multimodal analgesia used between 6 hours prior to anesthesia start to PACU discharge  Patient location during evaluation: bedside  Patient participation: complete - patient participated  Level of consciousness: awake and alert  Pain score: 0  Pain management: adequate  Airway patency: patent  Anesthetic complications: no  Cardiovascular status: acceptable  Respiratory status: acceptable  Hydration status: acceptable  Post anesthesia nausea and vomiting:  none  Final Post Anesthesia Temperature Assessment:  Normothermia (36.0-37.5 degrees C)      INITIAL Post-op Vital signs:   Vitals Value Taken Time   /92    Temp 36.1 °C (96.9 °F) 04/07/21 1134   Pulse 72 04/07/21 1134   Resp 23 04/07/21 1134   SpO2 97 % 04/07/21 1134

## 2021-04-07 NOTE — ANESTHESIA PREPROCEDURE EVALUATION
Relevant Problems   ENDOCRINE   (+) Severe obesity (HCC)       Anesthetic History   No history of anesthetic complications            Review of Systems / Medical History  Patient summary reviewed, nursing notes reviewed and pertinent labs reviewed    Pulmonary  Within defined limits                 Neuro/Psych   Within defined limits           Cardiovascular    Hypertension: well controlled              Exercise tolerance: >4 METS     GI/Hepatic/Renal  Within defined limits              Endo/Other        Morbid obesity and arthritis     Other Findings              Physical Exam    Airway  Mallampati: II  TM Distance: 4 - 6 cm  Neck ROM: normal range of motion   Mouth opening: Normal     Cardiovascular  Regular rate and rhythm,  S1 and S2 normal,  no murmur, click, rub, or gallop  Rhythm: regular  Rate: normal         Dental  No notable dental hx       Pulmonary  Breath sounds clear to auscultation               Abdominal  GI exam deferred       Other Findings            Anesthetic Plan    ASA: 2  Anesthesia type: regional, spinal and general - backup - saphenous block      Post-op pain plan if not by surgeon: peripheral nerve block single    Induction: Intravenous  Anesthetic plan and risks discussed with: Patient

## 2021-04-07 NOTE — PERIOP NOTES
[place] with PT in to work with pt, teach home exercises, and ambulate pt in hallway. Pt stable, VSS, pain currently 8/10, pt states she just needs to get up and move and she thinks it will feel better.

## 2021-04-07 NOTE — INTERVAL H&P NOTE
Update History & Physical    The Patient's History and Physical was reviewed with the patient. There was no change. The surgical site was confirmed by the patient and me. Plan:  The risk, benefits, expected outcome, and alternative to the recommended procedure have been discussed with the patient. Patient understands and wants to proceed with the procedure.       Electronically signed by Andrea Parada MD on 4/7/2021 at 8:02 AM

## 2021-04-07 NOTE — ANESTHESIA PROCEDURE NOTES
Spinal Block    Start time: 4/7/2021 9:37 AM  Performed by: Tony Raymond CRNA  Authorized by: Tony Raymond CRNA     Pre-procedure:   Indications: primary anesthetic      Spinal Block:   Patient Position:  Seated  Prep Region:  Lumbar  Prep: Betadine      Location:  L1-2  Technique:  Single shot        Needle:   Needle Type:  Quincke  Needle Gauge:  25 G  Attempts:  1      Events: CSF confirmed        Assessment:    Patient tolerance:  Patient tolerated the procedure well with no immediate complications

## 2021-04-07 NOTE — ANESTHESIA PROCEDURE NOTES
Peripheral Block    Start time: 4/7/2021 8:15 AM  End time: 4/7/2021 8:31 AM  Performed by: Maggy Gramajo CRNA  Authorized by: Maggy Gramajo CRNA       Pre-procedure: Indications: post-op pain management    Preanesthetic Checklist: patient identified, risks and benefits discussed, site marked, timeout performed, anesthesia consent given and patient being monitored    Timeout Time: 08:15          Block Type:   Block Type:   Adductor canal  Laterality:  Left  Monitoring:  Standard ASA monitoring, responsive to questions, oxygen, continuous pulse ox, frequent vital sign checks and heart rate  Injection Technique:  Single shot  Procedures: ultrasound guided    Patient Position: supine  Prep: chlorhexidine    Location:  Mid thigh  Needle Type:  Ultraplex  Needle Gauge:  22 G  Needle Localization:  Ultrasound guidance  Medication Injected:  Midazolam (VERSED) injection, 2 mg  bupivacaine (PF) (MARCAINE) 0.5% injection, 20 mL  dexamethasone (DECADRON) 4 mg/mL injection, 4 mg  Med Admin Time: 4/7/2021 8:31 AM    Assessment:  Number of attempts:  1  Injection Assessment:  Incremental injection every 5 mL, no paresthesia, no intravascular symptoms, negative aspiration for blood and ultrasound image on chart  Patient tolerance:  Patient tolerated the procedure well with no immediate complications

## 2021-04-07 NOTE — DISCHARGE INSTRUCTIONS
TOTAL KNEE REPLACEMENT DISCHARGE INFORMATION    You have undergone a Total Knee Replacement. The following list is to provide you with some expectations over the next week upon your discharge from the hospital.     1. Please continue your Aspirin 325mg every 12 hours (twice daily) or any other blood thinner starting tomorrow as directed until Dr. Kelvin Odom instructs you to discontinue it. If you are not sure which blood thinner to take please contact Dr. Gila Powers office next business day for clarification. 2. Please be sure to continue your thigh-high compression stockings on both sides until instructed to discontinue them. 3. Over the course of the next week, you should continue BOTH thigh high stockings, DO NOT GET THE INCISION WET until instructed to do so. 4. You may notice some bruising on your thigh and it may extend all the way to the ankles. That is perfectly normal early on.  5. You may experience a clicking noise in your knee and that is normal because of the artificial knee. 6. It is important to remember if you have any surgical procedure including dental procedures which may result in bleeding that an antibiotic 1 hour before the procedure will be required. Please let the provider performing the procedure know that you have artificial joint. If an antibiotic is not given by them please call our office and give us at least 5 business days to get you the appropriate antibiotics if needed. This rule applies indefinitely. 7. If an Ace wrap is placed on your knee you may remove the Ace wrap only 48 hours after your surgery. We will leave the stockings on.  8. During the course of your  over the next week, should you experience fevers of 101.5 F, a white drainage from the incision, extreme redness around the incision, or the incision begins to have a pungent smell; Please call our office or page Dr. Kelvin Odom whose numbers are provided in your discharge paperwork.   To Page Dr. Kelvin Odom please call [phone] and dial 0. Have the  page whomever is on call for Orthopedics. These are signs of infection and it should be addressed immediately. 9. Please do not drive until instructed to do so. 10. If you need a refill on pain medication please allow at least 2 business days notice for any refills. Immediate refill request may not be possible. Medication refill requests will not be addressed during non-business hours. Please do not page the on-call provider for pain medication refills after hours. 11. It is very important for you to begin your Outpatient Physical Therapy within a couple days of the day of your discharge and your appointment should have been set up. If your physical therapy has not been set up please call our office the next business day for assistance. Details provided in a separate sheet. 12. Remove ace wrap in 48 hrs after surgery but keep stockings on. 15. Finish all antibiotics, start the antibiotics as soon as you go home if you have prescribed antibiotics. 10.  You should perform your daily home exercises at least 4 times a day 30 minutes each time. 11.  Do not place anything under your knee while sleeping at night. Elevate your heel so your  is straight while sleeping at night. 12.  Perform deep breathing exercises 10 times every hour while awake. 13.  If you had a nerve block and you are not having pain the day of the surgery, at nighttime it is okay to take 1 pain medication before going to sleep to help prevent excruciating pain when the nerve block wears off. 14.  You may be given an ice pack machine use that to help prevent swelling. Do not apply heat to the incision area. 15.  While you are awake at least 10 times every 30 minutes move your foot up and down as if you are pumping gas from both feet to help prevent swelling and to promote blood circulation in the calf.   16.  If you develop sudden onset of shortness of breath or severe calf pain please go to [facility] emergency room. 17. Your pain medicine is a Narcotic and may cause constipation. You may take an over the counter stool softener while taking pain medicine. POLAR CARE INSTRUCTIONS:     DAY 1-3 WEAR CONTINUOUSLY WHILE AWAKE.  INSPECT SKIN EVERY 1-2 HOURS   MAKE SURE YOU PLACE A TOWEL IN BETWEEN SKIN AND POLAR PAD. DAY 4 AND ON USE AS NEEDED FOR PAIN CONTROL FOR 1 HOUR INTERVALS; NOT TO EXCEED 12 HOURS/DAY. 16. Your pain medicine is a Narcotic and may cause constipation. Things to watch for:             Increased swelling of the surgical site             Spreading of redness around the incision site             Drainage of pus from the incision site             Developing a fever of 101.5 °F or higher             If any of these symptoms occur you have any questions please contact our office at [phone]. If you need to talk to Dr. Frances Lutz on an urgent basis please call the hospital at [phone]. 0 for the . Please let the  know you are a surgical patient of Dr. Frances Lutz and you wish to get in contact with him. If Dr. Frances Lutz or his staff do not call you back within 30 minutes. Please tell the  to try again. Phone: [phone]  [email or web address]

## 2021-04-07 NOTE — PROGRESS NOTES
Problem: Mobility Impaired (Adult and Pediatric)  Goal: *Acute Goals and Plan of Care (Insert Text)  Description: Pt is MOD (I) with gait and navigates stairs with MOD (I). PLOF:Community ambulator who did not use an AD and who was (I) with ADLs. Outcome: Resolved/Met     Problem: Patient Education: Go to Patient Education Activity  Goal: Patient/Family Education  Outcome: Resolved/Met   PHYSICAL THERAPY EVALUATION AND DISCHARGE    Patient: Nate Bee (72 y.o. female)  Date: 4/7/2021  Primary Diagnosis: Primary osteoarthritis of left knee [M17.12]  Knee osteoarthritis [M17.10]  Procedure(s) (LRB):  LEFT TKA (HIGH BMI) (OUTPT) (Left) Day of Surgery   Precautions:  WBAT    ASSESSMENT :  Based on the objective data described below, the patient presents with L TKA and she is WBAT. She is able to ambulate with a RW with MOD (I) and she is able to navigate stairs with MOD (I). She is educated on a HEP and tolerates well. She can return home with outpatient P.T. Patient does not require further skilled intervention at this level of care. PLAN :  Recommendations and Planned Interventions:   No formal PT needs identified at this time. Discharge Recommendations: Outpatient  Further Equipment Recommendations for Discharge: N/A     SUBJECTIVE:   Patient states i'm ready to walk.     OBJECTIVE DATA SUMMARY:     Past Medical History:   Diagnosis Date    Abnormal EKG     Back pain     High cholesterol     Hypertension     Osteopenia     Sciatica     Urinary frequency     UTI (urinary tract infection)      Past Surgical History:   Procedure Laterality Date    HX HYSTERECTOMY      HX ORTHOPAEDIC      Spine Surgery      Barriers to Learning/Limitations: None  Compensate with: N/A  Home Situation:   Home Situation  Home Environment: Private residence  # Steps to Enter: 2  Rails to Enter: Yes  Hand Rails : Bilateral  One/Two Story Residence: One story  Living Alone: No  Support Systems: Spouse/Significant Other/Partner  Patient Expects to be Discharged to[de-identified] Private residence  Current DME Used/Available at Home: [employer], rolling  Critical Behavior:  Neurologic State: Alert  Orientation Level: Oriented X4    Strength:    Strength: Generally decreased, functional(L knee 4/5, SLR 1208, 2.5 hours after spinal)    Tone & Sensation:   Sensation: Intact    Range Of Motion:   AROM: Generally decreased, functional(L [id number])  PROM: Generally decreased, functional(L knee 10-95)  Posture:  Posture (WDL): Within defined limits  Functional Mobility:  Bed Mobility:  Rolling: Independent  Supine to Sit: Independent  Sit to Supine: Independent  Scooting: Independent  Transfers:  Sit to Stand: Modified independent  Stand to Sit: Modified independent    Balance:   Sitting: Intact  Standing: Intact  Ambulation/Gait Training:  Distance (ft): 650 Feet (ft)(And another 50')  Assistive Device: Walker, rolling  Ambulation - Level of Assistance: Modified independent     Gait Description (WDL): Exceptions to WDL  Gait Abnormalities: Antalgic     Left Side Weight Bearing: As tolerated    Stairs:  Number of Stairs Trained: 7(Able to reciprocate)  Stairs - Level of Assistance: Modified independent  Rail Use: Both     Today's TX:   Pt is able to ambulate with MOD (I) with a Rw. She is able to navigate stairs with MOD (I). She is educated on a HEP and states understanding. Pain:  Pain level pre-treatment: 8/10   Pain level post-treatment: 8/10  Pain Location: L knee  Pain Intervention(s): Medication (see MAR); Rest, Ice, Repositioning   Response to intervention: Nurse notified, See doc flow    Activity Tolerance:   Good  Please refer to the flowsheet for vital signs taken during this treatment.   After treatment:   []         Patient left in no apparent distress sitting up in chair  [x]         Patient left in no apparent distress in bed  []         Call bell left within reach  [x]         Nursing notified  []         Caregiver present  [] Bed alarm activated  []         SCDs applied    COMMUNICATION/EDUCATION:   [x]         Role of Physical Therapy in the acute care setting. []         Fall prevention education was provided and the patient/caregiver indicated understanding. []         Patient/family have participated as able in goal setting and plan of care. []         Patient/family agree to work toward stated goals and plan of care. []         Patient understands intent and goals of therapy, but is neutral about his/her participation. []         Patient is unable to participate in goal setting/plan of care: ongoing with therapy staff.  []         Other:     Thank you for this referral.  Joanna Campa, PT, DPT   Time Calculation: 25 mins

## 2021-04-09 ENCOUNTER — APPOINTMENT (OUTPATIENT)
Dept: PHYSICAL THERAPY | Age: 71
End: 2021-04-09
Payer: MEDICARE

## 2021-04-12 ENCOUNTER — HOSPITAL ENCOUNTER (OUTPATIENT)
Dept: PHYSICAL THERAPY | Age: 71
Discharge: HOME | End: 2021-04-12
Payer: MEDICARE

## 2021-04-12 PROCEDURE — 97116 GAIT TRAINING THERAPY: CPT

## 2021-04-12 PROCEDURE — 97530 THERAPEUTIC ACTIVITIES: CPT

## 2021-04-12 PROCEDURE — 97161 PT EVAL LOW COMPLEX 20 MIN: CPT

## 2021-04-12 NOTE — PROGRESS NOTES
[address] [facility] [address], [address] [facility], [address] [place] [address]  PLAN OF CARE / STATEMENT OF MEDICAL NECESSITY FOR PHYSICAL THERAPY SERVICES  Patient Name: Alicia Lugo : 1950   Medical   Diagnosis: Pain in left knee [M25.562] Treatment Diagnosis: L knee pain   Onset Date: 2021     Referral Source: Lucas Dickinson MD Start of Care [facility]): 2021   Prior Hospitalization: See medical history Provider #: [id number]   Prior Level of Function: Ind with all PLOF   Comorbidities: High cholesterol, HTN, Osteopenia   Medications: Verified on Patient Summary List   The Plan of Care and following information is based on the information from the initial evaluation.   ==========================================================================================  Assessment / Functional Analysis:    Pt is a 66y.o. year old female who presents to outpatient clinic today with complaints of intermittent 5/10 L knee pain s/p L TKA 2021. Pt presents to PT with decreased L knee ROM, decreased strength, increased pain, decreased balance, decreased ability to ascend/descend stairs, ambulate with normalized gait pattern, and perform work duties of choice.  Pt could benefit from skilled PT services to address the above impairments and to improve Pt ability to participate in functional activities of choice.     ==========================================================================================  Eval Complexity: History: LOW Complexity : Zero comorbidities / personal factors that will impact the outcome / POCExam:LOW Complexity : 1-2 Standardized tests and measures addressing body structure, function, activity limitation and / or participation in recreation  Presentation: LOW Complexity : Stable, uncomplicated  Clinical Decision Making:LOW Complexity Overall Complexity:LOW     Problem List: pain affecting function, decrease ROM, decrease strength, impaired gait/ balance, decrease ADL/ functional abilitiies, decrease activity tolerance and decrease flexibility/ joint mobility   Treatment Plan may include any combination of the following: Therapeutic exercise, Therapeutic activities, Physical agent/modality, Gait/balance training, Manual therapy, Patient education, Functional mobility training and Stair training  Patient / Family readiness to learn indicated by: asking questions, trying to perform skills and interest  Persons(s) to be included in education: patient (P)  Barriers to Learning/Limitations: None      Patient self reported health status: good  Rehabilitation Potential: good    Objective Measures:  Gait:                []? Normal    []? Abnormal    []? Antalgic    []? NWB    Device:                          Describe: Pt ambulates Ind demonstrating shortened LLE stance phase due to decreased L knee extension.     ROM / Strength  []? Unable to assess                  AROM                      PROM                   Strength (1-5)      Left Right Left Right Left Right   Hip Flexion         4/5 5/5     Extension                 Abduction         4/5 5/5     Adduction         4/5 5/5   Knee Flexion 88 118 105 125 4-/5 5/5     Extension -12 -6 -12 -4 4-/5 5/5   Ankle Plantarflexion         5/5 5/5     Dorsiflexion         5/5 5/5         Flexibility: []? Unable to assess at this time  Hamstrings:               (L) Tightness= []? WNL   []? Min   [x]? Mod   []? Severe                       (R) Tightness= []? WNL   []? Min   [x]? Mod   []? Severe  Gastroc:                 (L) Tightness= []? WNL   [x]? Min   []? Mod   []? Severe                       (R) Tightness= []? WNL   [x]? Min   []? Mod   []? Severe     Palpation:  TTP L knee at incision site.     Girth Measurements:   Mid patellar (cm)   L: 46.5, R: 41     TUG: 10 sec, Ind    Short Term Goals:  1.  Patient will report the knowledge of 3 exercises that can be used to help reduce symptoms to be able to ind reduce symptoms while at home. 2. Patient will demonstrate a 1/2 grade improvement in L knee MMT to be able to better ambulate with a normalized gait without pain. 3. Patient will demonstrate L knee PROM of 6 - 115 deg to facilitate increased ability to ascend/descend stairs. 4. Patient will increase LEFS > 10 points to facilitate increased ability to perform functional activities of choice. Long Term Goals:  1. Patient will demonstrate a 2 grade improvement in L knee MMT to be able to better ambulate with a normalized gait without pain. 2. Patient will demonstrate L knee AROM of 6 - 110 deg or greater to be able to return to normal ambulation on level and unlevel surfaces. 3. Patient will demonstrate the ability to ambulate community distances on even and uneven surfaces without an AD without evidence of antalgia to be able to return to an ind walking program following discharge. 4. Patient will increase LEFS > 20 points to facilitate increased ability to perform leisure activities of choice. Frequency / Duration: Patient to be seen  3  times per week for 8  weeks:  Patient / Caregiver education and instruction: activity modification and exercises    Therapist Signature: Po Boo PT. DPT Date: 0/59/1086   Certification Period: 04/12/2021 - 06/07/2021 Time: 1:59 PM   ===========================================================================================  I certify that the above Physical Therapy Services are being furnished while the patient is under my care. I agree with the treatment plan and certify that this therapy is necessary. Physician Signature:        Date:       Time:     Please sign and return to [facility] PT or you may fax the signed copy to [phone]. Please call [phone] if more information required. Thank you.

## 2021-04-12 NOTE — PROGRESS NOTES
PT KNEE EVAL AND DAILY NOTE 10-18    Patient Name: Kenneth Herndon  Date:2021  : 1950  [x]  Patient  Verified  Payor: BLUE CROSS MEDICARE / Plan: [address] HMO / Product Type: Managed Care Medicare /    In time: 676  Out time:113  Total Treatment Time (min): 34  Visit #: 1    Medicare/BCBS Only   Total Timed Codes (min):  0 1:1 Treatment Time:  34     Treatment Area: Pain in left knee [M25.562]    SUBJECTIVE  Pain Level (0-10 scale): 5/10  [x]Sharp  []Dull  []Achy []Burning []Throbbing []N&T []Other:   []constant []intermittent []improving []worsening []no change since onset    Any medication changes, allergies to medications, adverse drug reactions, diagnosis change, or new procedure performed?: [x] No    [] Yes (see summary sheet for update)  Subjective:     Pt is a 66 y.o. female presenting to PT without AD complaining of intermittent 5/10 L knee pain s/p L TKA 2021. Pt had R TKA 2020. Pt reports pain decreases with pain medication, ice, and walking. Pt reports pain does not increase with any specific activity. 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 Pt reports she lives in a single story home with 1 step to enter and 3 steps to enter her kitchen with B HR. Pt reports she has a tub with grab bars and a ledge to step over. 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 Past Medical History:   Diagnosis Date    Abnormal EKG     Back pain     High cholesterol     Hypertension     Osteopenia     Sciatica     Urinary frequency     UTI (urinary tract infection)      Past Surgical History:   Procedure Laterality Date    HX HYSTERECTOMY      HX ORTHOPAEDIC      Spine Surgery      Date of Surgery: 2021  Weight bearing status: WBAT LLE    Patient Goals: Pt would like to \"go a little bit further than I have\".   Previous Treatment/Compliance: Past PT for R TKA. LEFS Score: 49/80      OBJECTIVE/EXAMINATION  Gait:  [] Normal    [] Abnormal    [] Antalgic    [] NWB    Device:    Describe: Pt ambulates Ind demonstrating shortened LLE stance phase due to decreased L knee extension. ROM / Strength  [] Unable to assess                  AROM                      PROM                   Strength (1-5)    Left Right Left Right Left Right   Hip Flexion     4/5 5/5    Extension          Abduction     4/5 5/5    Adduction     4/5 5/5   Knee Flexion 88 118 105 125 4-/5 5/5    Extension -12 -6 -12 -4 4-/5 5/5   Ankle Plantarflexion     5/5 5/5    Dorsiflexion     5/5 5/5       Flexibility: [] Unable to assess at this time  Hamstrings:    (L) Tightness= [] WNL   [] Min   [x] Mod   [] Severe    (R) Tightness= [] WNL   [] Min   [x] Mod   [] Severe  Gastroc:      (L) Tightness= [] WNL   [x] Min   [] Mod   [] Severe    (R) Tightness= [] WNL   [x] Min   [] Mod   [] Severe    Palpation:  TTP L knee at incision site. Girth Measurements:   Mid patellar (cm)   L: 46.5, R: 41                TUG: 10 sec, Ind    Other tests/comments:  Mobility: Pt demonstrates ability to ascend/descend 4 stairs x 4 MI without use of B HR when ascending stairs and requiring B HR when descending stairs due to instability L knee. Single Leg Stance: L: 6 sec, mod instability; R: > 10 sec; minimal instability.     34 min [x]Eval                  []Re-Eval             With   [x] TE   [] TA   [] neuro   [] other: Patient Education: [x] Review HEP    [] Progressed/Changed HEP based on:   [] positioning   [] body mechanics   [] transfers   [x] heat/ice application    [] other:        Pain Level (0-10 scale) post treatment: 0/10      Assessment:    [x]  See Plan of Care  []  See progress note/recertification  []  See Discharge Summary           Shanika Gee, PT, DPT  4/12/2021  1:06 PM

## 2021-04-15 ENCOUNTER — VIRTUAL VISIT (OUTPATIENT)
Dept: ORTHOPEDIC SURGERY | Age: 71
End: 2021-04-15
Payer: MEDICARE

## 2021-04-15 DIAGNOSIS — M17.12 OSTEOARTHRITIS OF LEFT KNEE, UNSPECIFIED OSTEOARTHRITIS TYPE: Primary | ICD-10-CM

## 2021-04-15 PROCEDURE — 99024 POSTOP FOLLOW-UP VISIT: CPT | Performed by: ORTHOPAEDIC SURGERY

## 2021-04-15 NOTE — PROGRESS NOTES
Name: Tanna Morales    : 1950     Service Dept: [address] [place] [address] and Sports Medicine    Patient's Pharmacies:    [address] [place] [address]  Phone: [phone] Fax: [phone]       Chief Complaint   Patient presents with    Knee Pain    Surgical Follow-up        There were no vitals taken for this visit. Allergies   Allergen Reactions    Acetaminophen Rash     Tylenol #3      Current Outpatient Medications   Medication Sig Dispense Refill    atorvastatin (LIPITOR) 40 mg tablet       losartan (COZAAR) 100 mg tablet       verapamil ER (CALAN-SR) 240 mg CR tablet TAKE 1 TABLET BY MOUTH ONCE DAILY FOR 90 DAYS      ondansetron (ZOFRAN ODT) 4 mg disintegrating tablet Take 1 Tab by mouth every eight (8) hours as needed for Nausea or Nausea or Vomiting. 10 Tab 0    traMADoL (ULTRAM) 50 mg tablet Take 1 Tab by mouth every six (6) hours as needed for Pain for up to 14 days. Max Daily Amount: 200 mg. 30 Tab 0    cefUROXime (CEFTIN) 500 mg tablet TAKE 1 TABLET BY MOUTH TWICE DAILY      oxybutynin chloride XL (DITROPAN XL) 5 mg CR tablet Take 5 mg by mouth daily.  oxybutynin chloride XL (DITROPAN XL) 10 mg CR tablet Take 1 Tab by mouth daily. 90 Tab 6    diazepam (VALIUM) 5 mg tablet 5 mg every eight (8) hours as needed.  celecoxib (CELEBREX) 200 mg capsule 200 mg daily.  senna-docusate (PERICOLACE) 8.6-50 mg per tablet Take 2 Tabs by Mouth Every Morning.         Patient Active Problem List   Diagnosis Code    Urinary frequency R35.0    UTI (urinary tract infection) N39.0    Severe obesity (HCC) E66.01    Knee osteoarthritis M17.10      Family History   Problem Relation Age of Onset    Heart Disease Mother     Heart Attack Mother     Cancer Mother       Social History     Socioeconomic History    Marital status:      Spouse name: Not on file    Number of children: Not on file    Years of education: Not on file    Highest education level: Not on file   Tobacco Use    Smoking status: Never Smoker    Smokeless tobacco: Never Used   Substance and Sexual Activity    Alcohol use: No     Alcohol/week: 0.0 standard drinks    Drug use: No      Past Surgical History:   Procedure Laterality Date    HX HYSTERECTOMY      HX ORTHOPAEDIC      Spine Surgery       Past Medical History:   Diagnosis Date    Abnormal EKG     Back pain     High cholesterol     Hypertension     Osteopenia     Sciatica     Urinary frequency     UTI (urinary tract infection)         I have reviewed and agree with PFSH and ROS and intake form in chart and the record furthermore I have reviewed prior medical record(s) regarding this patients care during this appointment. Review of Systems:   Patient is a pleasant appearing individual, appropriately dressed, well hydrated, well nourished, who is alert, appropriately oriented for age, and in no acute distress with a normal gait and normal affect who does not appear to be in any significant pain. Physical Exam:  Left knee - Neurovascularly intact with good cap refill, full range of motion and full strength, well healed incision noted, no swelling, no erythema, no instability. Right knee - Decrease range of motion with flexion, Some crepitation, Grossly neurovascularly intact, Good cap refill, No skin lesion, Moderate swelling, No gross instability, Some quadriceps weakness   Encounter Diagnoses     ICD-10-CM ICD-9-CM   1. Osteoarthritis of left knee, unspecified osteoarthritis type  M17.12 715.96       HPI:  The patient is status post left total knee replacement. Surgery was on 4/7/2021. Doing well. Has no complaints. Little bit of soreness. Assessment/Plan:  Plan at this point, activities as tolerated, weightbearing started, no restrictions. We will see the patient back as needed or a yearly appointment status post left knee replacement.       As part of continued conservative pain management options the patient was advised to utilize Tylenol or OTC NSAIDS as long as it is not medically contraindicated. Ivon Somers, was evaluated through a synchronous (real-time) audio-video encounter. The patient (or guardian if applicable) is aware that this is a billable service. Verbal consent to proceed has been obtained within the past 12 months. The visit was conducted pursuant to the emergency declaration under the [address] [place] [address] and the [employer] General Act. Patient identification was verified, and a caregiver was present when appropriate. The patient was located in a state where the provider was credentialed to provide care. Return to Office: Follow-up and Dispositions    · Return in about 1 year (around 4/15/2022). Scribed by Elva Cheek LPN as dictated by [employer] [facility] [place] TREVOR Bearden MD.  Documentation True and Accepted Erasmo Bearden MD

## 2021-04-15 NOTE — PATIENT INSTRUCTIONS
Knee Pain or Injury: Care Instructions Your Care Instructions Injuries are a common cause of knee problems. Sudden (acute) injuries may be caused by a direct blow to the knee. They can also be caused by abnormal twisting, bending, or falling on the knee. Pain, bruising, or swelling may be severe, and may start within minutes of the injury. Overuse is another cause of knee pain. Other causes are climbing stairs, kneeling, and other activities that use the knee. Everyday wear and tear, especially as you get older, also can cause knee pain. Rest, along with home treatment, often relieves pain and allows your knee to heal. If you have a serious knee injury, you may need tests and treatment. Follow-up care is a key part of your treatment and safety. Be sure to make and go to all appointments, and call your doctor if you are having problems. It's also a good idea to know your test results and keep a list of the medicines you take. How can you care for yourself at home? · Be safe with medicines. Read and follow all instructions on the label. ? If the doctor gave you a prescription medicine for pain, take it as prescribed. ? If you are not taking a prescription pain medicine, ask your doctor if you can take an over-the-counter medicine. · Rest and protect your knee. Take a break from any activity that may cause pain. · Put ice or a cold pack on your knee for 10 to 20 minutes at a time. Put a thin cloth between the ice and your skin. · Prop up a sore knee on a pillow when you ice it or anytime you sit or lie down for the next 3 days. Try to keep it above the level of your heart. This will help reduce swelling. · If your knee is not swollen, you can put moist heat, a heating pad, or a warm cloth on your knee. · If your doctor recommends an elastic bandage, sleeve, or other type of support for your knee, wear it as directed.  
· Follow your doctor's instructions about how much weight you can put on your leg. Use a cane, crutches, or a walker as instructed. · Follow your doctor's instructions about activity during your healing process. If you can do mild exercise, slowly increase your activity. · Reach and stay at a healthy weight. Extra weight can strain the joints, especially the knees and hips, and make the pain worse. Losing even a few pounds may help. When should you call for help? Call 911 anytime you think you may need emergency care. For example, call if: 
  · You have symptoms of a blood clot in your lung (called a pulmonary embolism). These may include: 
? Sudden chest pain. ? Trouble breathing. ? Coughing up blood. Call your doctor now or seek immediate medical care if: 
  · You have severe or increasing pain.  
  · Your leg or foot turns cold or changes color.  
  · You cannot stand or put weight on your knee.  
  · Your knee looks twisted or bent out of shape.  
  · You cannot move your knee.  
  · You have signs of infection, such as: 
? Increased pain, swelling, warmth, or redness. ? Red streaks leading from the knee. ? Pus draining from a place on your knee. ? A fever.  
  · You have signs of a blood clot in your leg (called a deep vein thrombosis), such as: 
? Pain in your calf, back of the knee, thigh, or groin. ? Redness and swelling in your leg or groin. Watch closely for changes in your health, and be sure to contact your doctor if: 
  · You have tingling, weakness, or numbness in your knee.  
  · You have any new symptoms, such as swelling.  
  · You have bruises from a knee injury that last longer than 2 weeks.  
  · You do not get better as expected. Where can you learn more? Go to http://www.gray.com/ Enter K195 in the search box to learn more about \"Knee Pain or Injury: Care Instructions. \" Current as of: February 26, 2020               Content Version: 12.8 © 6338-8828 [employer].   
Care instructions adapted under license by [employer] (which disclaims liability or warranty for this information). If you have questions about a medical condition or this instruction, always ask your healthcare professional. [address] any warranty or liability for your use of this information.

## 2021-05-10 NOTE — PROGRESS NOTES
[facility] PHYSICAL THERAPY  [address] [place] [address] Dr MAYBERRY, [address] [place] [address], [facility] * Phone: [phone] * Fax: [phone]  DISCHARGE SUMMARY FOR PHYSICAL THERAPY            Patient Name: Joe Monahan : 1950   Treatment/Medical Diagnosis: Pain in left knee [M25.562]   Onset Date: 2021    Referral Source: Kadeem Leary MD Start of Care [facility]): 2021   Prior Hospitalization: See Medical History Provider #: [id number]   Prior Level of Function: Ind with all PLOF   Comorbidities: High cholesterol, HTN, Osteopenia   Medications: Verified on Patient Summary List   Visits from [facility]: 1         Subjective:  Pt to be discharged at this time as Pt did not call to schedule therapy visits. Objective:  Gait:                []? Normal    []? Abnormal    []? Antalgic    []? NWB    Device:                          Describe: Pt ambulates Ind demonstrating shortened LLE stance phase due to decreased L knee extension.     ROM / Strength  []? Unable to assess                  AROM                      PROM                   Strength (1-5)      Left Right Left Right Left Right   Hip Flexion         4/5 5/5     Extension                 Abduction         4/5 5/5     Adduction         4/5 5/5   Knee Flexion 88 118 105 125 4-/5 5/5     Extension -12 -6 -12 -4 4-/5 5/5   Ankle Plantarflexion         5/5 5/5     Dorsiflexion         5/5 5/5         Flexibility: []? Unable to assess at this time  Hamstrings:               (L) Tightness= []? WNL   []? Min   [x]? Mod   []? Severe                       (R) Tightness= []? WNL   []? Min   [x]? Mod   []? Severe  Gastroc:                 (L) Tightness= []? WNL   [x]? Min   []? Mod   []? Severe                       (R) Tightness= []? WNL   [x]? Min   []? Mod   []?  Severe     Palpation:  TTP L knee at incision site.     Girth Measurements:   Mid patellar (cm)   L: 46.5, R: 41                TUG: 10 sec, Ind     Other tests/comments:  Mobility: Pt demonstrates ability to ascend/descend 4 stairs x 4 MI without use of B HR when ascending stairs and requiring B HR when descending stairs due to instability L knee.     Single Leg Stance: L: 6 sec, mod instability; R: > 10 sec; minimal instability. Short Term Goals:  1. Patient will report the knowledge of 3 exercises that can be used to help reduce symptoms to be able to ind reduce symptoms while at home. Not Met  2. Patient will demonstrate a 1/2 grade improvement in L knee MMT to be able to better ambulate with a normalized gait without pain. Not Met  3. Patient will demonstrate L knee PROM of 6 - 115 deg to facilitate increased ability to ascend/descend stairs. Not Met  4. Patient will increase LEFS > 10 points to facilitate increased ability to perform functional activities of choice. Not Met     Long Term Goals:  1. Patient will demonstrate a 2 grade improvement in L knee MMT to be able to better ambulate with a normalized gait without pain. Not Met  2. Patient will demonstrate L knee AROM of 6 - 110 deg or greater to be able to return to normal ambulation on level and unlevel surfaces. Not Met  3. Patient will demonstrate the ability to ambulate community distances on even and uneven surfaces without an AD without evidence of antalgia to be able to return to an ind walking program following discharge. Not Met  4. Patient will increase LEFS > 20 points to facilitate increased ability to perform leisure activities of choice. Not Met     Key Functional Changes/Progress:   Pt to be discharged at this time as Pt did not call to schedule therapy visits. Pt has made no progress toward therapy goals. Assessments/Recommendations: Discontinue therapy due to lack of attendance or compliance. If you have any questions/comments please contact us directly at [phone]. Thank you for allowing us to assist in the care of your patient. Therapist Signature:  Estrellita Bhandari, PT, DPT Date: 5/10/2021   Reporting Period: 04/12/2021 - 05/10/2021 Time: 5:40 PM      Certification Period: 04/12/2021 - 06/07/2021       NOTE TO PHYSICIAN:  PLEASE COMPLETE THE ORDERS BELOW AND FAX TO   [facility] Physical Therapy: [phone]. If you are unable to process this request in 24 hours please contact our office: [phone].    ___ I have read the above report and request that my patient be discharged from therapy.      Physician Signature:        Date:       Time:

## 2021-05-13 DIAGNOSIS — M17.12 OSTEOARTHRITIS OF LEFT KNEE, UNSPECIFIED OSTEOARTHRITIS TYPE: ICD-10-CM

## 2021-05-13 DIAGNOSIS — M25.562 LEFT KNEE PAIN, UNSPECIFIED CHRONICITY: ICD-10-CM

## 2021-05-13 RX ORDER — TRAMADOL HYDROCHLORIDE 50 MG/1
TABLET ORAL
Qty: 30 TAB | Refills: 0 | Status: SHIPPED | OUTPATIENT
Start: 2021-05-13 | End: 2021-05-27

## 2022-03-18 PROBLEM — E66.01 SEVERE OBESITY (HCC): Status: ACTIVE | Noted: 2020-08-28

## 2022-03-19 PROBLEM — M17.9 KNEE OSTEOARTHRITIS: Status: ACTIVE | Noted: 2021-04-07

## 2023-12-29 ENCOUNTER — HOSPITAL ENCOUNTER (EMERGENCY)
Facility: HOSPITAL | Age: 73
Discharge: HOME | End: 2023-12-29
Attending: EMERGENCY MEDICINE
Payer: MEDICARE

## 2023-12-29 VITALS
SYSTOLIC BLOOD PRESSURE: 178 MMHG | OXYGEN SATURATION: 98 % | RESPIRATION RATE: 19 BRPM | BODY MASS INDEX: 34.93 KG/M2 | HEART RATE: 65 BPM | WEIGHT: 185 LBS | TEMPERATURE: 98 F | DIASTOLIC BLOOD PRESSURE: 69 MMHG | HEIGHT: 61 IN

## 2023-12-29 DIAGNOSIS — I10 ESSENTIAL HYPERTENSION: Primary | ICD-10-CM

## 2023-12-29 LAB
ALBUMIN SERPL-MCNC: 3.8 G/DL (ref 3.4–5)
ALBUMIN/GLOB SERPL: 1 (ref 0.8–1.7)
ALP SERPL-CCNC: 112 U/L (ref 45–117)
ALT SERPL-CCNC: 25 U/L (ref 13–56)
ANION GAP SERPL CALC-SCNC: 5 MMOL/L (ref 3–18)
AST SERPL-CCNC: 16 U/L (ref 10–38)
BASOPHILS # BLD: 0.1 K/UL (ref 0–0.1)
BASOPHILS NFR BLD: 1 % (ref 0–2)
BILIRUB SERPL-MCNC: 0.2 MG/DL (ref 0.2–1)
BUN SERPL-MCNC: 18 MG/DL (ref 7–18)
BUN/CREAT SERPL: 20 (ref 12–20)
CALCIUM SERPL-MCNC: 9.5 MG/DL (ref 8.5–10.1)
CHLORIDE SERPL-SCNC: 107 MMOL/L (ref 100–111)
CO2 SERPL-SCNC: 27 MMOL/L (ref 21–32)
CREAT SERPL-MCNC: 0.92 MG/DL (ref 0.6–1.3)
DIFFERENTIAL METHOD BLD: NORMAL
EOSINOPHIL # BLD: 0.3 K/UL (ref 0–0.4)
EOSINOPHIL NFR BLD: 3 % (ref 0–5)
ERYTHROCYTE [DISTWIDTH] IN BLOOD BY AUTOMATED COUNT: 13.4 % (ref 11.6–14.5)
GLOBULIN SER CALC-MCNC: 3.9 G/DL (ref 2–4)
GLUCOSE SERPL-MCNC: 110 MG/DL (ref 74–99)
HCT VFR BLD AUTO: 42.5 % (ref 35–45)
HGB BLD-MCNC: 13.8 G/DL (ref 12–16)
IMM GRANULOCYTES # BLD AUTO: 0 K/UL (ref 0–0.04)
IMM GRANULOCYTES NFR BLD AUTO: 0 % (ref 0–0.5)
LYMPHOCYTES # BLD: 3.2 K/UL (ref 0.9–3.6)
LYMPHOCYTES NFR BLD: 37 % (ref 21–52)
MCH RBC QN AUTO: 26.7 PG (ref 24–34)
MCHC RBC AUTO-ENTMCNC: 32.5 G/DL (ref 31–37)
MCV RBC AUTO: 82.4 FL (ref 78–100)
MONOCYTES # BLD: 0.6 K/UL (ref 0.05–1.2)
MONOCYTES NFR BLD: 8 % (ref 3–10)
NEUTS SEG # BLD: 4.3 K/UL (ref 1.8–8)
NEUTS SEG NFR BLD: 51 % (ref 40–73)
NRBC # BLD: 0 K/UL (ref 0–0.01)
NRBC BLD-RTO: 0 PER 100 WBC
PLATELET # BLD AUTO: 369 K/UL (ref 135–420)
PMV BLD AUTO: 9.5 FL (ref 9.2–11.8)
POTASSIUM SERPL-SCNC: 3.8 MMOL/L (ref 3.5–5.5)
PROT SERPL-MCNC: 7.7 G/DL (ref 6.4–8.2)
RBC # BLD AUTO: 5.16 M/UL (ref 4.2–5.3)
SODIUM SERPL-SCNC: 139 MMOL/L (ref 136–145)
TROPONIN I SERPL HS-MCNC: 10 NG/L (ref 0–54)
WBC # BLD AUTO: 8.5 K/UL (ref 4.6–13.2)

## 2023-12-29 PROCEDURE — 85025 COMPLETE CBC W/AUTO DIFF WBC: CPT

## 2023-12-29 PROCEDURE — 93005 ELECTROCARDIOGRAM TRACING: CPT | Performed by: EMERGENCY MEDICINE

## 2023-12-29 PROCEDURE — 80053 COMPREHEN METABOLIC PANEL: CPT

## 2023-12-29 PROCEDURE — 6370000000 HC RX 637 (ALT 250 FOR IP): Performed by: EMERGENCY MEDICINE

## 2023-12-29 PROCEDURE — 99284 EMERGENCY DEPT VISIT MOD MDM: CPT

## 2023-12-29 PROCEDURE — 84484 ASSAY OF TROPONIN QUANT: CPT

## 2023-12-29 RX ORDER — LOSARTAN POTASSIUM 100 MG/1
100 TABLET ORAL DAILY
Qty: 30 TABLET | Refills: 1 | Status: SHIPPED | OUTPATIENT
Start: 2023-12-29

## 2023-12-29 RX ORDER — LOSARTAN POTASSIUM 100 MG/1
100 TABLET ORAL DAILY
Qty: 30 TABLET | Refills: 2 | Status: SHIPPED | OUTPATIENT
Start: 2023-12-29

## 2023-12-29 RX ORDER — LOSARTAN POTASSIUM 50 MG/1
100 TABLET ORAL
Status: COMPLETED | OUTPATIENT
Start: 2023-12-29 | End: 2023-12-29

## 2023-12-29 RX ORDER — LOSARTAN POTASSIUM 100 MG/1
100 TABLET ORAL DAILY
Qty: 30 TABLET | Refills: 1 | Status: SHIPPED | OUTPATIENT
Start: 2023-12-29 | End: 2023-12-29

## 2023-12-29 RX ADMIN — LOSARTAN POTASSIUM 100 MG: 50 TABLET, FILM COATED ORAL at 20:35

## 2023-12-29 ASSESSMENT — LIFESTYLE VARIABLES: HOW OFTEN DO YOU HAVE A DRINK CONTAINING ALCOHOL: NEVER

## 2023-12-29 ASSESSMENT — PAIN - FUNCTIONAL ASSESSMENT: PAIN_FUNCTIONAL_ASSESSMENT: NONE - DENIES PAIN

## 2023-12-30 LAB
EKG ATRIAL RATE: 68 BPM
EKG DIAGNOSIS: NORMAL
EKG P AXIS: 67 DEGREES
EKG P-R INTERVAL: 162 MS
EKG Q-T INTERVAL: 400 MS
EKG QRS DURATION: 74 MS
EKG QTC CALCULATION (BAZETT): 425 MS
EKG R AXIS: 30 DEGREES
EKG T AXIS: 100 DEGREES
EKG VENTRICULAR RATE: 68 BPM

## 2023-12-30 PROCEDURE — 93010 ELECTROCARDIOGRAM REPORT: CPT | Performed by: INTERNAL MEDICINE

## 2023-12-30 NOTE — ED TRIAGE NOTES
Pt to ED for eval of hypertension. Pt report blurred vision, headache, had a dizzy spell this AM. Pt has been out of her losartan x 3 days, running out of verapamil, so only took 1/2 pill today.

## 2023-12-30 NOTE — ED NOTES
Pt placed in room, given home dose of Losartan at time of triage. Pt BP decreasing, states she feels much better.

## 2025-02-21 ENCOUNTER — HOSPITAL ENCOUNTER (EMERGENCY)
Age: 75
Discharge: LEFT BEFORE DISCHARGE | End: 2025-02-21

## 2025-02-28 NOTE — OP NOTES
Operative Note    Patient: Augie Rivera MRN: [id number]  Surgery Date: 4/7/2021  [unfilled]          Procedure  Primary Surgeon    LEFT TKA (HIGH BMI) (OUTPT)  Siri Heredia MD    * Panel 2 does not exist *  * Panel 2 does not exist *    * Panel 3 does not exist *  * Panel 3 does not exist *     Surgeon(s) and Role:     * Siri Heredia MD - Primary    Other OR Staff/Assistants:  Circ-1: Mickie Burrows  Scrub Tech-1: Cait Levo: Xiomy Gandhi  Surg Asst-1: Glenys Patella    1st Assistant Tasks:  Closing    Pre-operative Diagnosis:  Primary osteoarthritis of left knee [M17.12]    Post-operative Diagnosis: same as preop diagnosis    Anesthesia Type: Spinal     Findings: djd    Complications: No    EBL: 50 cc    Specimens: None    Implants     Implant    Cement Bne 40gm Full Dose Pmma W/ Gent Hi Visc Radpq Lng - [id number] - Implanted   (Left) Knee    Inventory item: CEMENT BNE 40GM FULL DOSE PMMA W/ GENT HI VISC RADPQ LNG Model/Cat number: [id number]    : [employer] Lot number: [id number]    Size: 40g      As of 4/7/2021     Status: Implanted                  Component Fem Sz 6 L Knee [place] Post Stbl [place] Attune - [id number] - Implanted   (Left) Knee    Inventory item: COMPONENT FEM SZ 6 L KNEE CHAPINCITO POST STBL CHELSEA ATTUNE Model/Cat number: [id number]    : [employer] Lot number: [id number]    Size: 6n left      As of 4/7/2021     Status: Implanted                  Insert Tib Sz 6 Thk5mm Knee Post Stbl Rot Platfrm Attune - [id number] - Implanted   (Left) Knee    Inventory item: INSERT TIB SZ 6 THK5MM KNEE POST STBL ROT PLATFRM ATTUNE Model/Cat number: [id number]    : [employer] Lot number: [id number]    Size: 6 5mm      As of 4/7/2021     Status: Implanted                  Baseplate Tib Sz 5 Rot Platfrm Co Chrom Molybdenum Ti Kidd - [id number] - Implanted   (Left) Knee    Inventory item: BASEPLATE Physical Therapy Visit    Visit Type: Daily Treatment Note  Visit: 6  Referring Provider: JAMAL Johnson  Medical Diagnosis (from order): 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     Pain / Symptoms  - Pain rating (out of 10): Current: 3       OBJECTIVE                                                                                                                                       Treatment     Therapeutic Exercise  Supine hip abduction right lower extremity - 2x10 reps   Bridge - 2x10 reps (cueing for posterior pelvic tilt)   Hip adduction squeeze with ball - 2x10 reps   Forward step up on #4 step - 10 reps (able to progress to #6 step for 4 reps)   NuStep - level 5 - 5 minutes     Manual Therapy   Soft tissue mobilization with thera-cane to right gluteals, hamstrings, and quads     Skilled input: verbal instruction/cues and as detailed above    Writer verbally educated and received verbal consent for hand placement, positioning of patient, and techniques to be performed today from patient for clothing adjustments for techniques, therapist position for techniques and hand placement and palpation for techniques as described above and how they are pertinent to the patient's plan of care.  Home Exercise Program  Access Code: [email or web address]  URL: [email or web address]  Date: 02/21/2025  Prepared by: Steve Enriquez    Exercises  - Seated Isometric Hip Adduction with Ball  - 3 x daily - 7 x weekly - 1 sets - 10 reps - 5 hold  - Seated Isometric Hip Abduction with Belt  - 3 x daily - 7 x weekly - 1 sets - 10 reps - 5 hold  - Standing March with Counter Support  - 3 x daily - 7 x weekly - 1 sets - 10 reps  - Standing Hip  Extension with Counter Support  - 3 x daily - 7 x weekly - 1 sets - 10 reps  - Heel Raises with Counter Support  - 3 x daily - 7 x weekly - 1 sets - 10 reps  - Toe Raises with Counter Support  - 3 x daily - 7 x weekly - 1 sets - 10 reps  - Standing Hip Flexor Stretch  - 2 x daily - 7 x weekly - 1 sets - 2 reps - 20-30 seconds  hold  - Supine Hip Abduction  - 3 x daily - 7 x weekly - 1 sets - 10 reps      ASSESSMENT                                                                                                            Improved ease of movement with supine right hip abduction. Standing range of motion exercises limited secondary to left knee pain however tolerated step ups well. Discussed progressing weight bearing onto right lower extremity as tolerated during her normal routines which patient verbalizes understanding. Would continue to benefit from PT services in order to address right hip weakness and range of motion to facilitate safe ambulation in all types of environment.   Education:   - Results of above outlined education: Demonstrates understanding and Verbalizes understanding    PLAN                                                                                                                           Suggestions for next session as indicated: Progress per plan of care, progress right hip range of motion and strength as tolerated        Therapy procedure time and total treatment time can be found documented on the Time Entry flowsheet     TIB SZ 5 ROT PLATFRM CO CHROM MOLYBDENUM TI ALLY Model/Cat number: [id number]    : [employer] Lot number: [id number]    Size: 5      As of 4/7/2021     Status: Implanted                  Component Pat [id number] Knee Poly Dome Brendan Medialized Attune - [id number] - Implanted   (Left) Knee    Inventory item: COMPONENT PAT [id number] KNEE POLY DOME BRENDAN MEDIALIZED ATTUNE Model/Cat number: [id number]    : [employer] Lot number: [id number]    Size: 35mm      As of 4/7/2021     Status: Implanted                         OPERATIVE PROCEDURE:  Please note the first assistant role was to help in patient positioning and draping of the extremity in a sterile fashion. Also during the surgery the assistant's responsibilities included but not limited to extremity positioning during critical portions of the surgery. Assisting in using and placement of retractors during surgery. Lower extremity was prepped and draped in a sterile fashion. After adequate anesthesia was given, the patient was placed in a well-padded supine position. Subvastus arthrotomy from the tibial tubercle to the superior pole of the patella was made. Knee was hyperflexed. Intramedullary reaming of distal femur and proximal tibia was performed. 10 mm of distal femur was cut. Anterior-posterior sizing guide was used. Anterior, posterior, chamfer cuts, and box cuts were made next. Proximal tibial cut and preparation performed. Posterior osteophyte meniscal remnants were removed, and also patella was everted. Free-hand cut of the patella was made. Trial components were placed. The patient was found to have excellent range of motion and stability with all trial components. All the trial components removed. Copious irrigation performed. Distal femur, proximal tibia, and patella were impacted in place. Excessive cement was removed.   After the cement was hard, Subvastus arthrotomy closed with Vicryl and Prineo stitch. Compressive dressing was applied. The patient was taken to PACU in stable condition. Please note due to the patient's BMI of greater than 30 significant surgical effort was required compared to the standard patient with a BMI lower than 30. Surgical time increased approximately 30% from the normal surgical time due to the patient's high BMI.       Megan Luo MD

## 2025-05-15 ENCOUNTER — TELEPHONE (OUTPATIENT)
Facility: CLINIC | Age: 75
End: 2025-05-15

## 2025-05-15 NOTE — TELEPHONE ENCOUNTER
Spoke w/pt and advised Dr. Nelson is leaving and not taking any  new patients. Gave her information for [facility].

## 2025-05-15 NOTE — TELEPHONE ENCOUNTER
----- Message from KIM ELKINS LPN sent at 5/15/2025 10:56 AM EDT -----  Regarding: FW: E[facility] Appointment Request  Please refer this one to [facility], that is where she lives  ----- Message -----  From: Arjun Wilson Jr.  Sent: 5/15/2025  10:45 AM EDT  To: [facility] Clinical Staff  Subject: ECC Appointment Request                          E[facility] Appointment Request    Patient needs appointment for E[facility] Appointment Type: New to Provider.    Patient Requested Dates(s):as soon as possible    Patient Requested Time:morning  Provider Name:Charis Nelson MD    Reason for Appointment Request: New Patient - Requested Provider unavailable  --------------------------------------------------------------------------------------------------------------------------    Relationship to Patient: Self     Call Back Information: OK to leave message on voicemail  Preferred Call Back Number:[phone]

## 2025-05-22 ENCOUNTER — OFFICE VISIT (OUTPATIENT)
Facility: CLINIC | Age: 75
End: 2025-05-22
Payer: MEDICARE

## 2025-05-22 VITALS
DIASTOLIC BLOOD PRESSURE: 75 MMHG | HEIGHT: 62 IN | SYSTOLIC BLOOD PRESSURE: 133 MMHG | OXYGEN SATURATION: 96 % | TEMPERATURE: 97.2 F | RESPIRATION RATE: 18 BRPM | BODY MASS INDEX: 34.41 KG/M2 | HEART RATE: 63 BPM | WEIGHT: 187 LBS

## 2025-05-22 DIAGNOSIS — Z11.59 ENCOUNTER FOR HCV SCREENING TEST FOR LOW RISK PATIENT: Primary | ICD-10-CM

## 2025-05-22 DIAGNOSIS — Z13.29 THYROID DISORDER SCREENING: ICD-10-CM

## 2025-05-22 DIAGNOSIS — Z13.220 SCREENING, LIPID: ICD-10-CM

## 2025-05-22 DIAGNOSIS — I10 PRIMARY HYPERTENSION: ICD-10-CM

## 2025-05-22 DIAGNOSIS — R53.83 FATIGUE, UNSPECIFIED TYPE: ICD-10-CM

## 2025-05-22 DIAGNOSIS — Z13.1 SCREENING FOR DIABETES MELLITUS (DM): ICD-10-CM

## 2025-05-22 DIAGNOSIS — R42 VERTIGO: ICD-10-CM

## 2025-05-22 DIAGNOSIS — Z11.4 ENCOUNTER FOR SCREENING FOR HIV: ICD-10-CM

## 2025-05-22 PROCEDURE — 1126F AMNT PAIN NOTED NONE PRSNT: CPT | Performed by: FAMILY MEDICINE

## 2025-05-22 PROCEDURE — 99204 OFFICE O/P NEW MOD 45 MIN: CPT | Performed by: FAMILY MEDICINE

## 2025-05-22 PROCEDURE — 3078F DIAST BP <80 MM HG: CPT | Performed by: FAMILY MEDICINE

## 2025-05-22 PROCEDURE — 3075F SYST BP GE 130 - 139MM HG: CPT | Performed by: FAMILY MEDICINE

## 2025-05-22 PROCEDURE — 1123F ACP DISCUSS/DSCN MKR DOCD: CPT | Performed by: FAMILY MEDICINE

## 2025-05-22 RX ORDER — VERAPAMIL HYDROCHLORIDE 240 MG/1
240 TABLET, FILM COATED, EXTENDED RELEASE ORAL NIGHTLY
Qty: 90 TABLET | Refills: 1 | Status: SHIPPED | OUTPATIENT
Start: 2025-05-22

## 2025-05-22 RX ORDER — SPIRONOLACTONE AND HYDROCHLOROTHIAZIDE 25; 25 MG/1; MG/1
1 TABLET ORAL DAILY
Qty: 90 TABLET | Refills: 1 | Status: SHIPPED | OUTPATIENT
Start: 2025-05-22

## 2025-05-22 RX ORDER — LOSARTAN POTASSIUM 100 MG/1
100 TABLET ORAL DAILY
Qty: 90 TABLET | Refills: 1 | Status: SHIPPED | OUTPATIENT
Start: 2025-05-22

## 2025-05-22 RX ORDER — SPIRONOLACTONE AND HYDROCHLOROTHIAZIDE 25; 25 MG/1; MG/1
1 TABLET ORAL DAILY
COMMUNITY
End: 2025-05-22 | Stop reason: SDUPTHER

## 2025-05-22 RX ORDER — MECLIZINE HYDROCHLORIDE 25 MG/1
25 TABLET ORAL 3 TIMES DAILY PRN
Qty: 30 TABLET | Refills: 0 | Status: SHIPPED | OUTPATIENT
Start: 2025-05-22 | End: 2025-06-01

## 2025-05-22 SDOH — ECONOMIC STABILITY: FOOD INSECURITY: WITHIN THE PAST 12 MONTHS, THE FOOD YOU BOUGHT JUST DIDN'T LAST AND YOU DIDN'T HAVE MONEY TO GET MORE.: NEVER TRUE

## 2025-05-22 SDOH — ECONOMIC STABILITY: FOOD INSECURITY: WITHIN THE PAST 12 MONTHS, YOU WORRIED THAT YOUR FOOD WOULD RUN OUT BEFORE YOU GOT MONEY TO BUY MORE.: NEVER TRUE

## 2025-05-22 ASSESSMENT — PATIENT HEALTH QUESTIONNAIRE - PHQ9
SUM OF ALL RESPONSES TO PHQ QUESTIONS 1-9: 0
2. FEELING DOWN, DEPRESSED OR HOPELESS: NOT AT ALL
SUM OF ALL RESPONSES TO PHQ QUESTIONS 1-9: 0
1. LITTLE INTEREST OR PLEASURE IN DOING THINGS: NOT AT ALL

## 2025-05-22 NOTE — PROGRESS NOTES
Carol Jasso is a 75 y.o. female who presents to the office today for the following:  Chief Complaint   Patient presents with    New Patient    Hypertension       Allergies   Allergen Reactions    Acetaminophen Rash     Tylenol #3         Current Outpatient Medications:     meclizine (ANTIVERT) 25 MG tablet, Take 1 tablet by mouth 3 times daily as needed for Dizziness or Nausea, Disp: 30 tablet, Rfl: 0    losartan (COZAAR) 100 MG tablet, Take 1 tablet by mouth daily, Disp: 90 tablet, Rfl: 1    verapamil (CALAN SR) 240 MG extended release tablet, Take 1 tablet by mouth nightly, Disp: 90 tablet, Rfl: 1    spironolactone-hydroCHLOROthiazide (ALDACTAZIDE) 25-25 MG per tablet, Take 1 tablet by mouth daily 1/2 tab prn, Disp: 90 tablet, Rfl: 1      Past Medical History:   Diagnosis Date    Abnormal EKG     Back pain     High cholesterol     Hypertension     Osteopenia     Sciatica     Urinary frequency     UTI (urinary tract infection)        Past Surgical History:   Procedure Laterality Date    HYSTERECTOMY (CERVIX STATUS UNKNOWN)      ORTHOPEDIC SURGERY      Spine Surgery        Social History     Socioeconomic History    Marital status:      Spouse name: Not on file    Number of children: Not on file    Years of education: Not on file    Highest education level: Not on file   Occupational History    Not on file   Tobacco Use    Smoking status: Never    Smokeless tobacco: Never   Vaping Use    Vaping status: Never Used   Substance and Sexual Activity    Alcohol use: No     Alcohol/week: 0.0 standard drinks of alcohol    Drug use: No    Sexual activity: Not Currently   Other Topics Concern    Not on file   Social History Narrative    Not on file     Social Drivers of Health     Financial Resource Strain: Not on file   Food Insecurity: No Food Insecurity (5/22/2025)    Hunger Vital Sign     Worried About Running Out of Food in the Last Year: Never true     Ran Out of Food in the Last Year: Never true

## (undated) DEVICE — GLOVE SURG 7 BIOGEL PI ULTRATOUCH G

## (undated) DEVICE — STRYKER PERFORMANCE SERIES SAGITTAL BLADE: Brand: STRYKER PERFORMANCE SERIES

## (undated) DEVICE — STERILE POLYISOPRENE POWDER-FREE SURGICAL GLOVES: Brand: PROTEXIS

## (undated) DEVICE — GOWN,AURORA,FABRIC-REINFORCED,X-LARGE: Brand: MEDLINE

## (undated) DEVICE — SUTURE VCRL SZ 0 L27IN ABSRB UD L36MM CT-1 1/2 CIR J260H

## (undated) DEVICE — DRESSING SURG 4X14 IN POSTOP SIL BORD MEPILEX

## (undated) DEVICE — UNDERGLOVE SURG SZ 7.5 BLU LTX FREE SYN POLYISOPRENE

## (undated) DEVICE — SPONGE LAP SOFT 18X18 IN X RAY DETECTABLE

## (undated) DEVICE — HOOD WITH PEEL AWAY FACE SHIELD: Brand: T7PLUS

## (undated) DEVICE — ATTUNE SOLO PINNING SYSTEM

## (undated) DEVICE — SLEEVE COMPR UNIV WOMEN REG FT GARMENTS

## (undated) DEVICE — BLADE ELECTRODE: Brand: VALLEYLAB

## (undated) DEVICE — STERILE POLYISOPRENE POWDER-FREE SURGICAL GLOVES WITH EMOLLIENT COATING: Brand: PROTEXIS

## (undated) DEVICE — TOTAL KNEE PACK: Brand: MEDLINE INDUSTRIES, INC.

## (undated) DEVICE — GOWN,SIRUS,FABRNF,XL,20/CS: Brand: MEDLINE

## (undated) DEVICE — SUTURE MCRYL SZ 3-0 L27IN ABSRB UD L24MM PS-1 3/8 CIR PRIM Y936H

## (undated) DEVICE — BOWL BNE CEM MIX SPAT CURET SMARTMIX CTS

## (undated) DEVICE — (D)PREP SKN CHLRAPRP APPL 26ML -- CONVERT TO ITEM 371833

## (undated) DEVICE — DRAPE,TOP,102X53,STERILE: Brand: MEDLINE

## (undated) DEVICE — RECIPROCATING BLADE HEAVY DUTY LONG, OFFSET  (77.6 X 0.77 X 11.2MM)

## (undated) DEVICE — SHEET,DRAPE,70X100,STERILE: Brand: MEDLINE

## (undated) DEVICE — SUTURE VCRL SZ 2-0 L27IN ABSRB UD L26MM CT-2 1/2 CIR J269H

## (undated) DEVICE — HYPODERMIC SAFETY NEEDLE: Brand: MAGELLAN

## (undated) DEVICE — SKIN CLOS DERMABND PRINEO 60CM -- DERMABOUND PRINEO

## (undated) DEVICE — HOOD, PEEL-AWAY: Brand: FLYTE

## (undated) DEVICE — (D)HANDPIECE IRR W/HI FLO TIP -- DUPLICATE USE ITEM 121586

## (undated) DEVICE — T5 HOOD WITH PEEL AWAY FACE SHIELD

## (undated) DEVICE — INTENDED FOR TISSUE SEPARATION, AND OTHER PROCEDURES THAT REQUIRE A SHARP SURGICAL BLADE TO PUNCTURE OR CUT.: Brand: BARD-PARKER SAFETY BLADES SIZE 10, STERILE

## (undated) DEVICE — SUTURE VCRL SZ 1 L27IN ABSRB UD CT-1 L36MM 1/2 CIR J261H

## (undated) DEVICE — BNDG,ELSTC,MATRIX,STRL,6"X5YD,LF,HOOK&LP: Brand: MEDLINE

## (undated) DEVICE — ZIMMER® STERILE DISPOSABLE TOURNIQUET CUFF WITH PLC, DUAL PORT, SINGLE BLADDER, 34 IN. (86 CM)

## (undated) DEVICE — SOL IRR NACL 0.9% 500ML POUR --

## (undated) DEVICE — POWDER HEMOSTAT GEL 3.0GR -- SURGICEL

## (undated) DEVICE — 3M™ IOBAN™ 2 ANTIMICROBIAL INCISE DRAPE 6650EZ: Brand: IOBAN™ 2

## (undated) DEVICE — MANIFOLD CART ULT HI FLOW W 3 PRT FOR SUCT